# Patient Record
Sex: FEMALE | Race: OTHER | HISPANIC OR LATINO | URBAN - METROPOLITAN AREA
[De-identification: names, ages, dates, MRNs, and addresses within clinical notes are randomized per-mention and may not be internally consistent; named-entity substitution may affect disease eponyms.]

---

## 2020-02-29 ENCOUNTER — EMERGENCY (EMERGENCY)
Facility: HOSPITAL | Age: 45
LOS: 1 days | Discharge: HOME | End: 2020-02-29
Attending: EMERGENCY MEDICINE
Payer: SELF-PAY

## 2020-02-29 VITALS
HEART RATE: 115 BPM | RESPIRATION RATE: 20 BRPM | DIASTOLIC BLOOD PRESSURE: 72 MMHG | SYSTOLIC BLOOD PRESSURE: 117 MMHG | TEMPERATURE: 98 F | OXYGEN SATURATION: 97 %

## 2020-02-29 DIAGNOSIS — Z98.891 HISTORY OF UTERINE SCAR FROM PREVIOUS SURGERY: Chronic | ICD-10-CM

## 2020-02-29 LAB
ALBUMIN SERPL ELPH-MCNC: 4.2 G/DL — SIGNIFICANT CHANGE UP (ref 3.3–5.2)
ALP SERPL-CCNC: 60 U/L — SIGNIFICANT CHANGE UP (ref 40–120)
ALT FLD-CCNC: 40 U/L — HIGH
ANION GAP SERPL CALC-SCNC: 14 MMOL/L — SIGNIFICANT CHANGE UP (ref 5–17)
ANISOCYTOSIS BLD QL: SLIGHT — SIGNIFICANT CHANGE UP
APPEARANCE UR: CLEAR — SIGNIFICANT CHANGE UP
AST SERPL-CCNC: 35 U/L — HIGH
BACTERIA # UR AUTO: ABNORMAL
BASOPHILS # BLD AUTO: 0 K/UL — SIGNIFICANT CHANGE UP (ref 0–0.2)
BASOPHILS NFR BLD AUTO: 0 % — SIGNIFICANT CHANGE UP (ref 0–2)
BILIRUB SERPL-MCNC: 0.7 MG/DL — SIGNIFICANT CHANGE UP (ref 0.4–2)
BILIRUB UR-MCNC: NEGATIVE — SIGNIFICANT CHANGE UP
BUN SERPL-MCNC: 15 MG/DL — SIGNIFICANT CHANGE UP (ref 8–20)
CALCIUM SERPL-MCNC: 9.6 MG/DL — SIGNIFICANT CHANGE UP (ref 8.6–10.2)
CHLORIDE SERPL-SCNC: 97 MMOL/L — LOW (ref 98–107)
CO2 SERPL-SCNC: 23 MMOL/L — SIGNIFICANT CHANGE UP (ref 22–29)
COLOR SPEC: YELLOW — SIGNIFICANT CHANGE UP
CREAT SERPL-MCNC: 0.5 MG/DL — SIGNIFICANT CHANGE UP (ref 0.5–1.3)
DIFF PNL FLD: ABNORMAL
ELLIPTOCYTES BLD QL SMEAR: SLIGHT — SIGNIFICANT CHANGE UP
EOSINOPHIL # BLD AUTO: 0 K/UL — SIGNIFICANT CHANGE UP (ref 0–0.5)
EOSINOPHIL NFR BLD AUTO: 0 % — SIGNIFICANT CHANGE UP (ref 0–6)
EPI CELLS # UR: SIGNIFICANT CHANGE UP
GIANT PLATELETS BLD QL SMEAR: PRESENT — SIGNIFICANT CHANGE UP
GLUCOSE SERPL-MCNC: 133 MG/DL — HIGH (ref 70–99)
GLUCOSE UR QL: NEGATIVE MG/DL — SIGNIFICANT CHANGE UP
HCG UR QL: NEGATIVE — SIGNIFICANT CHANGE UP
HCT VFR BLD CALC: 41.9 % — SIGNIFICANT CHANGE UP (ref 34.5–45)
HGB BLD-MCNC: 14.3 G/DL — SIGNIFICANT CHANGE UP (ref 11.5–15.5)
HYPOCHROMIA BLD QL: SLIGHT — SIGNIFICANT CHANGE UP
KETONES UR-MCNC: NEGATIVE — SIGNIFICANT CHANGE UP
LEUKOCYTE ESTERASE UR-ACNC: NEGATIVE — SIGNIFICANT CHANGE UP
LIDOCAIN IGE QN: 28 U/L — SIGNIFICANT CHANGE UP (ref 22–51)
LYMPHOCYTES # BLD AUTO: 0.1 K/UL — LOW (ref 1–3.3)
LYMPHOCYTES # BLD AUTO: 0.9 % — LOW (ref 13–44)
MACROCYTES BLD QL: SLIGHT — SIGNIFICANT CHANGE UP
MANUAL SMEAR VERIFICATION: SIGNIFICANT CHANGE UP
MCHC RBC-ENTMCNC: 32.9 PG — SIGNIFICANT CHANGE UP (ref 27–34)
MCHC RBC-ENTMCNC: 34.1 GM/DL — SIGNIFICANT CHANGE UP (ref 32–36)
MCV RBC AUTO: 96.3 FL — SIGNIFICANT CHANGE UP (ref 80–100)
MICROCYTES BLD QL: SLIGHT — SIGNIFICANT CHANGE UP
MONOCYTES # BLD AUTO: 0.19 K/UL — SIGNIFICANT CHANGE UP (ref 0–0.9)
MONOCYTES NFR BLD AUTO: 1.7 % — LOW (ref 2–14)
NEUTROPHILS # BLD AUTO: 10.68 K/UL — HIGH (ref 1.8–7.4)
NEUTROPHILS NFR BLD AUTO: 90.4 % — HIGH (ref 43–77)
NEUTS BAND # BLD: 3.5 % — SIGNIFICANT CHANGE UP (ref 0–8)
NITRITE UR-MCNC: NEGATIVE — SIGNIFICANT CHANGE UP
OVALOCYTES BLD QL SMEAR: SLIGHT — SIGNIFICANT CHANGE UP
PH UR: 8 — SIGNIFICANT CHANGE UP (ref 5–8)
PLAT MORPH BLD: NORMAL — SIGNIFICANT CHANGE UP
PLATELET # BLD AUTO: 379 K/UL — SIGNIFICANT CHANGE UP (ref 150–400)
PLATELET COUNT - ESTIMATE: NORMAL — SIGNIFICANT CHANGE UP
POTASSIUM SERPL-MCNC: 4.8 MMOL/L — SIGNIFICANT CHANGE UP (ref 3.5–5.3)
POTASSIUM SERPL-SCNC: 4.8 MMOL/L — SIGNIFICANT CHANGE UP (ref 3.5–5.3)
PROT SERPL-MCNC: 7.9 G/DL — SIGNIFICANT CHANGE UP (ref 6.6–8.7)
PROT UR-MCNC: 30 MG/DL
RBC # BLD: 4.35 M/UL — SIGNIFICANT CHANGE UP (ref 3.8–5.2)
RBC # FLD: 11.9 % — SIGNIFICANT CHANGE UP (ref 10.3–14.5)
RBC BLD AUTO: NORMAL — SIGNIFICANT CHANGE UP
RBC CASTS # UR COMP ASSIST: SIGNIFICANT CHANGE UP /HPF (ref 0–4)
SODIUM SERPL-SCNC: 134 MMOL/L — LOW (ref 135–145)
SP GR SPEC: 1.01 — SIGNIFICANT CHANGE UP (ref 1.01–1.02)
UROBILINOGEN FLD QL: 1 MG/DL
VARIANT LYMPHS # BLD: 3.5 % — SIGNIFICANT CHANGE UP (ref 0–6)
WBC # BLD: 11.37 K/UL — HIGH (ref 3.8–10.5)
WBC # FLD AUTO: 11.37 K/UL — HIGH (ref 3.8–10.5)
WBC UR QL: SIGNIFICANT CHANGE UP

## 2020-02-29 PROCEDURE — 96374 THER/PROPH/DIAG INJ IV PUSH: CPT | Mod: XU

## 2020-02-29 PROCEDURE — 74177 CT ABD & PELVIS W/CONTRAST: CPT

## 2020-02-29 PROCEDURE — 99053 MED SERV 10PM-8AM 24 HR FAC: CPT

## 2020-02-29 PROCEDURE — 96375 TX/PRO/DX INJ NEW DRUG ADDON: CPT

## 2020-02-29 PROCEDURE — 80053 COMPREHEN METABOLIC PANEL: CPT

## 2020-02-29 PROCEDURE — T1013: CPT

## 2020-02-29 PROCEDURE — 85027 COMPLETE CBC AUTOMATED: CPT

## 2020-02-29 PROCEDURE — 81025 URINE PREGNANCY TEST: CPT

## 2020-02-29 PROCEDURE — 36415 COLL VENOUS BLD VENIPUNCTURE: CPT

## 2020-02-29 PROCEDURE — 74177 CT ABD & PELVIS W/CONTRAST: CPT | Mod: 26

## 2020-02-29 PROCEDURE — 81001 URINALYSIS AUTO W/SCOPE: CPT

## 2020-02-29 PROCEDURE — 99285 EMERGENCY DEPT VISIT HI MDM: CPT

## 2020-02-29 PROCEDURE — 83690 ASSAY OF LIPASE: CPT

## 2020-02-29 PROCEDURE — 99284 EMERGENCY DEPT VISIT MOD MDM: CPT | Mod: 25

## 2020-02-29 RX ORDER — ONDANSETRON 8 MG/1
1 TABLET, FILM COATED ORAL
Qty: 21 | Refills: 0
Start: 2020-02-29 | End: 2020-03-06

## 2020-02-29 RX ORDER — ONDANSETRON 8 MG/1
4 TABLET, FILM COATED ORAL ONCE
Refills: 0 | Status: COMPLETED | OUTPATIENT
Start: 2020-02-29 | End: 2020-02-29

## 2020-02-29 RX ORDER — KETOROLAC TROMETHAMINE 30 MG/ML
15 SYRINGE (ML) INJECTION ONCE
Refills: 0 | Status: DISCONTINUED | OUTPATIENT
Start: 2020-02-29 | End: 2020-02-29

## 2020-02-29 RX ADMIN — ONDANSETRON 4 MILLIGRAM(S): 8 TABLET, FILM COATED ORAL at 08:12

## 2020-02-29 RX ADMIN — Medication 15 MILLIGRAM(S): at 08:15

## 2020-02-29 NOTE — ED ADULT NURSE REASSESSMENT NOTE - NS ED NURSE REASSESS COMMENT FT1
pt discharged to follow up with [facility],   discharge instructions via Haitian interpretor,  feels better

## 2020-02-29 NOTE — ED STATDOCS - CARE PROVIDER_API CALL
Kristen Talbot (DO)  Gastroenterology  [address], [address]  [place] [address]  Phone: [phone]  Fax: [phone]  Follow Up Time: 7-10 Days

## 2020-02-29 NOTE — ED STATDOCS - NSFOLLOWUPINSTRUCTIONS_ED_ALL_ED_FT
1)  YOUR CT SCAN FOUND A RIGHT OVARIAN CYST.  MAKE AN APPOINTMENT WITH GYN AT [facility] FOR FOLLOW UP, BRING ALL RESULTS WITH YOU TO YOUR APPOINTMENT.  2) YOUR CT SCAN FOUND FAT IN YOUR LIVER, MAKE AN APPOINTMENT WITH THE GASTROENTEROLOGIST.  3) ZOFRAN IS FOR VOMITING, TAKE AS NEEDED.

## 2020-02-29 NOTE — ED STATDOCS - PATIENT PORTAL LINK FT
You can access the FollowMyHealth Patient Portal offered by [facility] by registering at the following website: [email or web address]. By joining [employer]’s FollowMyHealth portal, you will also be able to view your health information using other applications (apps) compatible with our system.

## 2020-02-29 NOTE — ED STATDOCS - CLINICAL SUMMARY MEDICAL DECISION MAKING FREE TEXT BOX
43 y/o F with diffuse abdominal pain on exam greatest LLQ, will obtain, labs, ua, ct scan, and medicate for symptoms, reassess.

## 2020-02-29 NOTE — ED STATDOCS - CARE PROVIDERS DIRECT ADDRESSES
[email or web address]

## 2020-02-29 NOTE — ED STATDOCS - CARE PLAN
Principal Discharge DX:	Generalized abdominal pain  Secondary Diagnosis:	Vomiting  Secondary Diagnosis:	Fatty liver  Secondary Diagnosis:	Cyst of right ovary

## 2020-02-29 NOTE — ED ADULT TRIAGE NOTE - CHIEF COMPLAINT QUOTE
Pt c/o abdominal pain and N/V since yesterday. Denies any travel, denies chest pain and SOB and fever.

## 2020-02-29 NOTE — ED STATDOCS - OBJECTIVE STATEMENT
45 y/o F presents with c/o vomiting x 7 since 9pm last night and diffuse abdominal pain.  Denies fever, diarrhea, had hard stool this morning.  Has not taken anything for her symptoms, no relieving factors.

## 2020-02-29 NOTE — ED STATDOCS - ATTENDING CONTRIBUTION TO CARE
The patient seen and examined.    Abdominal Pain    I, Lm Thompson, performed the initial face to face bedside interview with this patient regarding history of present illness, review of symptoms and relevant past medical, social and family history.  I completed an independent physical examination.  I was the initial provider who evaluated this patient. I have signed out the follow up of any pending tests (i.e. labs, radiological studies) to the ACP.  I have communicated the patient’s plan of care and disposition with the ACP.

## 2020-11-07 ENCOUNTER — EMERGENCY (EMERGENCY)
Facility: HOSPITAL | Age: 45
LOS: 1 days | Discharge: HOME | End: 2020-11-07
Attending: EMERGENCY MEDICINE
Payer: SELF-PAY

## 2020-11-07 VITALS
WEIGHT: 179.9 LBS | HEART RATE: 82 BPM | SYSTOLIC BLOOD PRESSURE: 115 MMHG | TEMPERATURE: 98 F | HEIGHT: 60 IN | OXYGEN SATURATION: 95 % | DIASTOLIC BLOOD PRESSURE: 71 MMHG | RESPIRATION RATE: 14 BRPM

## 2020-11-07 DIAGNOSIS — Z98.891 HISTORY OF UTERINE SCAR FROM PREVIOUS SURGERY: Chronic | ICD-10-CM

## 2020-11-07 PROCEDURE — 71046 X-RAY EXAM CHEST 2 VIEWS: CPT

## 2020-11-07 PROCEDURE — 72125 CT NECK SPINE W/O DYE: CPT

## 2020-11-07 PROCEDURE — 70450 CT HEAD/BRAIN W/O DYE: CPT | Mod: 26

## 2020-11-07 PROCEDURE — 72125 CT NECK SPINE W/O DYE: CPT | Mod: 26

## 2020-11-07 PROCEDURE — 71046 X-RAY EXAM CHEST 2 VIEWS: CPT | Mod: 26

## 2020-11-07 PROCEDURE — 73030 X-RAY EXAM OF SHOULDER: CPT | Mod: 26,RT

## 2020-11-07 PROCEDURE — 70450 CT HEAD/BRAIN W/O DYE: CPT

## 2020-11-07 PROCEDURE — 73030 X-RAY EXAM OF SHOULDER: CPT

## 2020-11-07 PROCEDURE — T1013: CPT

## 2020-11-07 PROCEDURE — 99284 EMERGENCY DEPT VISIT MOD MDM: CPT | Mod: 25

## 2020-11-07 PROCEDURE — 99285 EMERGENCY DEPT VISIT HI MDM: CPT

## 2020-11-07 RX ORDER — ACETAMINOPHEN 500 MG
650 TABLET ORAL ONCE
Refills: 0 | Status: COMPLETED | OUTPATIENT
Start: 2020-11-07 | End: 2020-11-07

## 2020-11-07 RX ORDER — LIDOCAINE 4 G/100G
1 CREAM TOPICAL
Qty: 5 | Refills: 0
Start: 2020-11-07 | End: 2020-11-11

## 2020-11-07 RX ORDER — LIDOCAINE 4 G/100G
1 CREAM TOPICAL ONCE
Refills: 0 | Status: COMPLETED | OUTPATIENT
Start: 2020-11-07 | End: 2020-11-07

## 2020-11-07 RX ORDER — IBUPROFEN 200 MG
1 TABLET ORAL
Qty: 16 | Refills: 0
Start: 2020-11-07 | End: 2020-11-10

## 2020-11-07 RX ORDER — METHOCARBAMOL 500 MG/1
750 TABLET, FILM COATED ORAL ONCE
Refills: 0 | Status: COMPLETED | OUTPATIENT
Start: 2020-11-07 | End: 2020-11-07

## 2020-11-07 RX ORDER — METHOCARBAMOL 500 MG/1
1 TABLET, FILM COATED ORAL
Qty: 16 | Refills: 0
Start: 2020-11-07 | End: 2020-11-10

## 2020-11-07 RX ADMIN — Medication 650 MILLIGRAM(S): at 21:16

## 2020-11-07 RX ADMIN — METHOCARBAMOL 750 MILLIGRAM(S): 500 TABLET, FILM COATED ORAL at 21:16

## 2020-11-07 RX ADMIN — LIDOCAINE 1 PATCH: 4 CREAM TOPICAL at 21:16
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
## 2020-11-07 NOTE — ED PROVIDER NOTE - PATIENT PORTAL LINK FT
You can access the FollowMyHealth Patient Portal offered by [facility] by registering at the following website: [email or web address]. By joining [employer]’s FollowMyHealth portal, you will also be able to view your health information using other applications (apps) compatible with our system.
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
## 2020-11-07 NOTE — ED PROVIDER NOTE - MUSCULOSKELETAL, MLM
C spine with mild tenderness, and associated paraspinal tenderness, No T-L spine midline tenderness, range of motion is not limited, R sided chest wall tenderness to palp, R shoulder diffusely tender, nl flexion and extension

## 2020-11-08 PROBLEM — K76.0 FATTY (CHANGE OF) LIVER, NOT ELSEWHERE CLASSIFIED: Chronic | Status: ACTIVE | Noted: 2020-02-29

## 2021-02-01 ENCOUNTER — EMERGENCY (EMERGENCY)
Facility: HOSPITAL | Age: 46
LOS: 1 days | Discharge: HOME | End: 2021-02-01
Attending: EMERGENCY MEDICINE
Payer: COMMERCIAL

## 2021-02-01 VITALS
OXYGEN SATURATION: 96 % | DIASTOLIC BLOOD PRESSURE: 85 MMHG | TEMPERATURE: 99 F | HEART RATE: 117 BPM | HEIGHT: 60 IN | RESPIRATION RATE: 20 BRPM | WEIGHT: 179.9 LBS | SYSTOLIC BLOOD PRESSURE: 134 MMHG

## 2021-02-01 VITALS — RESPIRATION RATE: 18 BRPM | HEART RATE: 93 BPM | TEMPERATURE: 98 F | OXYGEN SATURATION: 100 %

## 2021-02-01 DIAGNOSIS — Z98.891 HISTORY OF UTERINE SCAR FROM PREVIOUS SURGERY: Chronic | ICD-10-CM

## 2021-02-01 LAB — SARS-COV-2 RNA SPEC QL NAA+PROBE: SIGNIFICANT CHANGE UP

## 2021-02-01 PROCEDURE — 99284 EMERGENCY DEPT VISIT MOD MDM: CPT

## 2021-02-01 PROCEDURE — U0005: CPT

## 2021-02-01 PROCEDURE — U0003: CPT

## 2021-02-01 RX ORDER — ONDANSETRON 8 MG/1
4 TABLET, FILM COATED ORAL ONCE
Refills: 0 | Status: COMPLETED | OUTPATIENT
Start: 2021-02-01 | End: 2021-02-01

## 2021-02-01 RX ORDER — ONDANSETRON 8 MG/1
1 TABLET, FILM COATED ORAL
Qty: 6 | Refills: 0
Start: 2021-02-01 | End: 2021-02-02

## 2021-02-01 RX ORDER — FAMOTIDINE 10 MG/ML
20 INJECTION INTRAVENOUS ONCE
Refills: 0 | Status: COMPLETED | OUTPATIENT
Start: 2021-02-01 | End: 2021-02-01

## 2021-02-01 RX ADMIN — Medication 30 MILLILITER(S): at 08:09

## 2021-02-01 RX ADMIN — FAMOTIDINE 20 MILLIGRAM(S): 10 INJECTION INTRAVENOUS at 08:09

## 2021-02-01 RX ADMIN — ONDANSETRON 4 MILLIGRAM(S): 8 TABLET, FILM COATED ORAL at 08:09

## 2021-02-01 NOTE — ED ADULT TRIAGE NOTE - CHIEF COMPLAINT QUOTE
Pt arrives to ED c/o  generalized abd pain  nausea and vomiting onset yesterday ,  +  chills - exposed to sick person last Sunday , pt arrives with her daughter who has the same symptoms

## 2021-02-01 NOTE — ED PROVIDER NOTE - PATIENT PORTAL LINK FT
You can access the FollowMyHealth Patient Portal offered by [facility] by registering at the following website: [email or web address]. By joining [employer]’s FollowMyHealth portal, you will also be able to view your health information using other applications (apps) compatible with our system.

## 2021-02-01 NOTE — ED PROVIDER NOTE - OBJECTIVE STATEMENT
45 year old female with no PMHx presents to the ED for diffuse abd pain which started this morning. Pts daughter here with the same sx. Both exposed to COVID 1 week ago. Reports vomiting, diarrhea but denies fevers, chills, cough or shortness of breath.

## 2021-02-01 NOTE — ED PROVIDER NOTE - ATTENDING CONTRIBUTION TO CARE
46 yo female with n/v/d  here with daughter with similar symptoms  +covid exposure  abd soft, nontender  well appearing  po meds, covid swab

## 2021-02-01 NOTE — ED PROVIDER NOTE - CLINICAL SUMMARY MEDICAL DECISION MAKING FREE TEXT BOX
Pt with exposure to covid 1 week ago. Covid vs. gastroenteritis. Daughter here with same, Will test for covid and give GI cocktail and PO challenge.

## 2021-02-01 NOTE — ED PROVIDER NOTE - NSFOLLOWUPINSTRUCTIONS_ED_ALL_ED_FT
Seguimiento con el médico de cabecera en 2-3 días   Instrucciones de yosef de COVID que se le proporcionan   [place] Zofran según sea necesario para las náuseas     Volver a la ED si los vómitos empeoran, presenta dolor que empeora, dificultad para respirar o dolor en el pecho o cualquier otro síntoma nuevo o preocupante

## 2021-02-18 ENCOUNTER — EMERGENCY (EMERGENCY)
Facility: HOSPITAL | Age: 46
LOS: 1 days | Discharge: HOME | End: 2021-02-18
Attending: STUDENT IN AN ORGANIZED HEALTH CARE EDUCATION/TRAINING PROGRAM
Payer: COMMERCIAL

## 2021-02-18 VITALS
DIASTOLIC BLOOD PRESSURE: 75 MMHG | RESPIRATION RATE: 18 BRPM | HEIGHT: 60 IN | HEART RATE: 79 BPM | WEIGHT: 179.9 LBS | TEMPERATURE: 98 F | OXYGEN SATURATION: 99 % | SYSTOLIC BLOOD PRESSURE: 121 MMHG

## 2021-02-18 DIAGNOSIS — Z98.891 HISTORY OF UTERINE SCAR FROM PREVIOUS SURGERY: Chronic | ICD-10-CM

## 2021-02-18 PROCEDURE — 99284 EMERGENCY DEPT VISIT MOD MDM: CPT | Mod: 25

## 2021-02-18 PROCEDURE — 70450 CT HEAD/BRAIN W/O DYE: CPT | Mod: 26

## 2021-02-18 PROCEDURE — 73080 X-RAY EXAM OF ELBOW: CPT

## 2021-02-18 PROCEDURE — 72125 CT NECK SPINE W/O DYE: CPT | Mod: 26

## 2021-02-18 PROCEDURE — 72125 CT NECK SPINE W/O DYE: CPT

## 2021-02-18 PROCEDURE — 70450 CT HEAD/BRAIN W/O DYE: CPT

## 2021-02-18 PROCEDURE — 99285 EMERGENCY DEPT VISIT HI MDM: CPT

## 2021-02-18 PROCEDURE — 73080 X-RAY EXAM OF ELBOW: CPT | Mod: 26,LT

## 2021-02-18 RX ORDER — IBUPROFEN 200 MG
1 TABLET ORAL
Qty: 28 | Refills: 0
Start: 2021-02-18 | End: 2021-02-24

## 2021-02-18 RX ORDER — ACETAMINOPHEN 500 MG
650 TABLET ORAL ONCE
Refills: 0 | Status: COMPLETED | OUTPATIENT
Start: 2021-02-18 | End: 2021-02-18

## 2021-02-18 RX ORDER — METHOCARBAMOL 500 MG/1
1 TABLET, FILM COATED ORAL
Qty: 14 | Refills: 0
Start: 2021-02-18 | End: 2021-02-24

## 2021-02-18 RX ADMIN — Medication 650 MILLIGRAM(S): at 11:41

## 2021-02-18 NOTE — ED STATDOCS - PATIENT PORTAL LINK FT
You can access the FollowMyHealth Patient Portal offered by [facility] by registering at the following website: [email or web address]. By joining [employer]’s FollowMyHealth portal, you will also be able to view your health information using other applications (apps) compatible with our system.

## 2021-02-18 NOTE — ED STATDOCS - CARE PROVIDER_API CALL
Jimmy Davies)  Orthopaedic Surgery  [address] [place] [address]  Phone: [phone]  Fax: [phone]  Follow Up Time:

## 2021-02-18 NOTE — ED STATDOCS - NS ED ROS FT
ROS:  GEN: (-) fevers/chills  NECK: (-) stiffness, (-) swelling  RESP: (-) shortness of breath, (-) cough  CV: (-) chest pain, (-) palpitations  GI: (-) nausea, (-) vomiting, (-) pain, (-) constipation, (-) diarrhea  : (-) hematuria, (-) dysuria  EXT: (+) elbow pain (-) edema  NEURO: (-) weakness, (+) headache, (-) dizziness, (-) syncope  MSK: (+) back pain

## 2021-02-18 NOTE — ED STATDOCS - CLINICAL SUMMARY MEDICAL DECISION MAKING FREE TEXT BOX
Check head CT, Xray, provide pain control and reassess CT r/o traumatic injury. xray elbow, provide pain control and reassess

## 2021-02-18 NOTE — ED STATDOCS - DOMESTIC TRAVEL HIGH RISK QUESTION
Dr. Reynoso  Good evening, for the past two weeks, I have been suffering with my sinuses and now I believe it developed into an URI and was wondering if I could have a prescription for Azithromycin (Z-Pack) sent to my pharmacy on file. I normally would take Loratadine (Claritin) on a daily basis but my insurance doesn't cover it, so I also wanting a prescription for my sinus to take on daily basis.  Thank you    This message was sent via [employer].  Please advise.   No
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
## 2021-02-18 NOTE — ED STATDOCS - NSFOLLOWUPINSTRUCTIONS_ED_ALL_ED_FT
Closed Head Injury    A closed head injury is an injury to your head that may or may not involve a traumatic brain injury (TBI).  A CT scan of the head may not have been performed because they are usually normal after a concussion. Concussions are diagnosed and managed based on the history given and the symptoms experienced after the head injury. Most concussions do not cause serious problem and get better over several days.  Symptoms of TBI can be short or long lasting and include headache, dizziness, interference with memory or speech, fatigue, confusion, changes in sleep, mood changes, nausea, depression/anxiety, and dulling of senses. Make sure to obtain proper rest which includes getting plenty of sleep, avoiding excessive visual stimulation, and avoiding activities that may cause physical or mental stress. Avoid any situation where there is potential for another head injury, including sports.    SEEK IMMEDIATE MEDICAL CARE IF YOU HAVE ANY OF THE FOLLOWING SYMPTOMS: unusual drowsiness, vomiting, severe dizziness, seizures, lightheadedness, muscular weakness, different pupil sizes, visual changes, or clear or bloody discharge from your ears or nose.     Contusion    A contusion is a deep bruise. Contusions are the result of a blunt injury to tissues and muscle fibers under the skin. The skin overlying the contusion may turn blue, purple, or yellow. Symptoms also include pain and swelling in the injured area.    SEEK IMMEDIATE MEDICAL CARE IF YOU HAVE ANY OF THE FOLLOWING SYMPTOMS: severe pain, numbness, tingling, pain, weakness, or skin color/temperature change in any part of your body distal to the injury.     Please follow up with your doctor within 48 hours

## 2021-02-18 NOTE — ED STATDOCS - ATTENDING CONTRIBUTION TO CARE
I, Bianca Kohli, performed a face to face bedside interview with this patient regarding history of present illness, review of symptoms and relevant past medical, social and family history.  I completed an independent physical examination. Medical decision making, follow-up on ordered tests (ie labs, radiologic studies) and re-evaluation of the patient's status has been communicated to the ACP.  Disposition of the patient will be based on test outcome and response to ED interventions.

## 2021-02-18 NOTE — ED ADULT TRIAGE NOTE - CHIEF COMPLAINT QUOTE
pt A&OX 4 from c/o headache after slip & fall on ice onto concrete hitting back of head. Pt denies LOC or blood thinner use. Pt with no visible injuries. Ambulatory in ED with steady gait

## 2021-02-18 NOTE — ED STATDOCS - PROGRESS NOTE DETAILS
PT evaluated by intake physician. HPI/ROS/PE as noted above. Will follow up plan per intake physician   ED return precautions discussed.

## 2021-02-18 NOTE — ED STATDOCS - PHYSICAL EXAMINATION
Vital Signs per nursing documentation  Gen: well appearing, no acute distress  HEENT: NCAT, MMM  Cardiac: regular rate rhythm, normal S1S2  Chest: clear to auscultation bilateral, no wheezes or crackles  Abdomen: soft, non tender non distended  Extremity: no gross deformity, good perfusion  Skin: no rash  Neuro: nonfocal neuro exam, gait steady Vital Signs per nursing documentation  Gen: well appearing, no acute distress  HEENT: NCAT, MMM, PERRLA  Cardiac: regular rate rhythm, normal S1S2  Chest: clear to auscultation bilateral, no wheezes or crackles  Abdomen: soft, non tender non distended  Extremity: no gross deformity, good perfusion  Skin: no rash  Neuro: nonfocal neuro exam, gait steady

## 2021-02-18 NOTE — ED STATDOCS - CARE PROVIDERS DIRECT ADDRESSES
[email or web address]

## 2021-12-21 ENCOUNTER — APPOINTMENT (OUTPATIENT)
Dept: ORTHOPEDIC SURGERY | Facility: CLINIC | Age: 46
End: 2021-12-21
Payer: COMMERCIAL

## 2021-12-21 VITALS
HEIGHT: 60 IN | WEIGHT: 190 LBS | DIASTOLIC BLOOD PRESSURE: 80 MMHG | HEART RATE: 88 BPM | BODY MASS INDEX: 37.3 KG/M2 | SYSTOLIC BLOOD PRESSURE: 118 MMHG

## 2021-12-21 VITALS
HEART RATE: 88 BPM | HEIGHT: 60 IN | BODY MASS INDEX: 41.23 KG/M2 | WEIGHT: 210 LBS | DIASTOLIC BLOOD PRESSURE: 80 MMHG | SYSTOLIC BLOOD PRESSURE: 118 MMHG

## 2021-12-21 DIAGNOSIS — M70.62 TROCHANTERIC BURSITIS, RIGHT HIP: ICD-10-CM

## 2021-12-21 DIAGNOSIS — M47.816 SPONDYLOSIS W/OUT MYELOPATHY OR RADICULOPATHY, LUMBAR REGION: ICD-10-CM

## 2021-12-21 DIAGNOSIS — M70.61 TROCHANTERIC BURSITIS, RIGHT HIP: ICD-10-CM

## 2021-12-21 PROCEDURE — 99203 OFFICE O/P NEW LOW 30 MIN: CPT

## 2021-12-21 PROCEDURE — 99072 ADDL SUPL MATRL&STAF TM PHE: CPT

## 2021-12-21 PROCEDURE — 72100 X-RAY EXAM L-S SPINE 2/3 VWS: CPT

## 2021-12-21 NOTE — ADDENDUM
[FreeTextEntry1] : Documented by Tripp Thibodeaux acting as a scribe for April Scales on 12/21/2021 . All medical record entries made by the Scribe were at my, April Scales , direction and personally dictated by me on 12/21/2021 . I have reviewed the chart and agree that the record accurately reflects my personal performance of the history, physical exam, assessment and plan. I have also personally directed, reviewed, and agreed with the chart.

## 2021-12-21 NOTE — PHYSICAL EXAM
[Obese] : obese [Poor Appearance] : well-appearing [Acute Distress] : not in acute distress [de-identified] : CONSTITUTIONAL: The patient is a very pleasant individual who is well-nourished and who appears stated age.\par PSYCHIATRIC: The patient is alert and oriented X 3 and in no apparent distress, and participates with orthopedic evaluation well.\par HEAD: Atraumatic and is nonsyndromic in appearance.\par EENT: No visible thyromegaly, EOMI.\par RESPIRATORY: Respiratory rate is regular, not dyspneic on examination.\par LYMPHATICS: There is no inguinal lymphadenopathy\par INTEGUMENTARY: Skin is clean, dry, and intact about the bilateral lower extremities and lumbar spine.\par VASCULAR: There is brisk capillary refill about the bilateral lower extremities. Deep tendon reflexes are well maintained at 2+/4 of the bilateral lower extremities and are symmetric.\par MUSCULOSKELETAL: There is no visible muscular atrophy. Manual motor strength is well maintained in the bilateral lower extremities. mechanically oriented lower back pain, pain with lumbar extension. + Tension sign and straight leg raise bilaterally. Quad extension, ankle dorsiflexion, EHL, plantar flexion, and ankle eversion are well preserved. Normal secondary orthopaedic exam of bilateral hips, greater trochanteric area, knees and ankles. No pain with internal or external rotation of the bilateral hips. Pain with palpation to the lumbar paraspinals BL. greater trochanteric bursitis [de-identified] : Xray of the Lumbar spine taken 12/21/2021 : AP projection shows pedicles are well visualized L1-L5, no rotoscoliosis, lateral projections show well maintained lumbar lordosis with no acute processes noted. Mild decrease in intervertebral disc space.

## 2021-12-21 NOTE — DISCUSSION/SUMMARY
[de-identified] : We talked about the nature of the condition and treatment options. Anticipatory guidance regarding neurogenic claudication and mechanically oriented lower back pain. Patient has been referred to physical therapy for decreased pain modalities, stretching and strengthening modalities, soft tissue modalities, and physical modalities. I will provide a prescription for Medrol dose pack for pain relief, patient was educated on the antiinflammatory properties of this medication and how this will help their pain. The patient will follow up in 4 - 6 weeks for a repeat clinical assessment If pain persists at this point we will consider ordering an MRI to further assess these complaints. \par \par Prior to appointment and during encounter with patient extensive medical records were reviewed including but not limited to, hospital records, out patient records, imaging results, and lab data. During this appointment the patient was examined, diagnoses were discussed and explained in a face to face manner. In addition extensive time was spent reviewing aforementioned diagnostic studies. Counseling including abnormal image results, differential diagnoses, treatment options, risk and benefits, lifestyle changes, current condition, and current medications was performed. Patient's comments, questions, and concerns were address and patient verbalized understanding. Based on this patient's presentation at our office, which is an orthopedic spine surgeon's office, this patient inherently / intrinsically has a risk, however minute, of developing  issues such as Cauda equina syndrome, bowel and bladder changes, or progression of motor or neurological deficits such as paralysis which may be permanent.

## 2021-12-21 NOTE — HISTORY OF PRESENT ILLNESS
[de-identified] : 46 year old F presents for an initial evaluation of left sided lower back pain, particularly painful today. There is pain radiating down the BL LEs. She states that when she tries to get up the pain forces her to return to a sitting position. Pain has been present for 1 year. Pain is becoming more frequent. No conservative care, she is taking Advil and Tylenol. She states she takes a high dose of these medications. Pain is achy in quality. She states walking is significantly painful. \par \par Visit conducted in [place]. Of note patient states her job requires long periods of sitting and standing.  [Ataxia] : no ataxia [Incontinence] : no incontinence [Loss of Dexterity] : good dexterity [Urinary Ret.] : no urinary retention

## 2022-10-28 ENCOUNTER — APPOINTMENT (OUTPATIENT)
Dept: RHEUMATOLOGY | Facility: CLINIC | Age: 47
End: 2022-10-28

## 2022-11-18 ENCOUNTER — APPOINTMENT (OUTPATIENT)
Dept: RHEUMATOLOGY | Facility: CLINIC | Age: 47
End: 2022-11-18

## 2023-04-24 ENCOUNTER — EMERGENCY (EMERGENCY)
Facility: HOSPITAL | Age: 48
LOS: 1 days | Discharge: HOME | End: 2023-04-24
Attending: EMERGENCY MEDICINE
Payer: COMMERCIAL

## 2023-04-24 VITALS
RESPIRATION RATE: 18 BRPM | SYSTOLIC BLOOD PRESSURE: 115 MMHG | DIASTOLIC BLOOD PRESSURE: 79 MMHG | TEMPERATURE: 98 F | HEART RATE: 70 BPM | OXYGEN SATURATION: 99 %

## 2023-04-24 VITALS — WEIGHT: 195.11 LBS | HEIGHT: 64 IN

## 2023-04-24 DIAGNOSIS — Z98.891 HISTORY OF UTERINE SCAR FROM PREVIOUS SURGERY: Chronic | ICD-10-CM

## 2023-04-24 LAB — S PYO DNA THROAT QL NAA+PROBE: SIGNIFICANT CHANGE UP

## 2023-04-24 PROCEDURE — 87798 DETECT AGENT NOS DNA AMP: CPT

## 2023-04-24 PROCEDURE — 99284 EMERGENCY DEPT VISIT MOD MDM: CPT

## 2023-04-24 PROCEDURE — 99283 EMERGENCY DEPT VISIT LOW MDM: CPT

## 2023-04-24 PROCEDURE — 87651 STREP A DNA AMP PROBE: CPT

## 2023-04-24 PROCEDURE — T1013: CPT

## 2023-04-24 PROCEDURE — 96372 THER/PROPH/DIAG INJ SC/IM: CPT

## 2023-04-24 RX ORDER — KETOROLAC TROMETHAMINE 30 MG/ML
15 SYRINGE (ML) INJECTION ONCE
Refills: 0 | Status: DISCONTINUED | OUTPATIENT
Start: 2023-04-24 | End: 2023-04-24

## 2023-04-24 RX ADMIN — Medication 15 MILLIGRAM(S): at 09:59

## 2023-04-24 RX ADMIN — Medication 15 MILLIGRAM(S): at 10:32

## 2023-04-24 NOTE — ED PROVIDER NOTE - OBJECTIVE STATEMENT
47F no PMHx p/w cough, congestion, low grade fevers, sore throat for 2 days.  Otherwise denies bleeding, SOB, chest pain, abdominal pain or fevers.  Denies other pertinent medical problems.  Denies any substance use.

## 2023-04-24 NOTE — ED ADULT TRIAGE NOTE - CHIEF COMPLAINT QUOTE
pt c/o sore throat and dry cough for 4 days with difficulty swallowing due to the pain. pt states she had ow grade fevers. pt states she took tylenol with minimal relief.

## 2023-04-24 NOTE — ED PROVIDER NOTE - CLINICAL SUMMARY MEDICAL DECISION MAKING FREE TEXT BOX
Rapid strep neg, pain meds given, Workup is unremarkable for any emergent process.   Patient is now feeling improved and wishes to leave.  Patient / family members have capacity.    Patient/family was given full return precautions, counseled on red flag symptoms such as LOC, fever, severe pain, or focal deficits and advised to return to the ED for these reasons or any reason that was concerning to them. Patient/family was informed of all significant and incidental findings found on this workup today and all results were reviewed.   All questions were answered, advised to make close follow up with their primary care provider and specialty clinics (as applicable) to follow up with this visit and continue investigation/treatment.   Patient/family has shown adequate understanding and is agreeable to the plan.

## 2023-04-24 NOTE — ED ADULT NURSE NOTE - OBJECTIVE STATEMENT
Assumed care of pt at 0940 in . Pt A&Ox4 c/o sore throat for approx 4 days. Pt denies any respiratory distress. Pt denies CP and SOB. RR Even and unlabored.

## 2023-04-24 NOTE — ED PROVIDER NOTE - PATIENT PORTAL LINK FT
You can access the FollowMyHealth Patient Portal offered by [facility] by registering at the following website: [email or web address]. By joining [employer]’s FollowMyHealth portal, you will also be able to view your health information using other applications (apps) compatible with our system.

## 2023-04-24 NOTE — ED PROVIDER NOTE - NSFOLLOWUPINSTRUCTIONS_ED_ALL_ED_FT
Viral Respiratory Infection    A viral respiratory infection is an illness that affects parts of the body used for breathing, like the lungs, nose, and throat. It is caused by a germ called a virus. Symptoms can include runny nose, coughing, sneezing, fatigue, body aches, sore throat, fever, or headache. Over the counter medicine can be used to manage the symptoms but the infection typically goes away on its own in 5 to 10 days.     SEEK IMMEDIATE MEDICAL CARE IF YOU HAVE ANY OF THE FOLLOWING SYMPTOMS: shortness of breath, chest pain, fever over 10 days, or lightheadedness/dizziness.    Infección respiratoria viral    Marlena infección respiratoria viral es marlena enfermedad que afecta partes del cuerpo que se usan para respirar, rosemary los pulmones, la nariz y la garganta. Es causada por un germen llamado virus. Los síntomas pueden incluir secreción nasal, tos, estornudos, fatiga, kae corporales, dolor de garganta, fiebre o dolor de cinthia. Se pueden usar medicamentos de venta marie para controlar los síntomas, philly la infección generalmente desaparece por sí bayron en 5 a 10 días.    BUSQUE ATENCIÓN MÉDICA INMEDIATA SI TIENE ALGUNO DE LOS SIGUIENTES SÍNTOMAS: dificultad para respirar, dolor en el pecho, fiebre amanda 10 días o aturdimiento/mareos.

## 2023-04-24 NOTE — ED PROVIDER NOTE - ATTENDING CONTRIBUTION TO CARE
Dr. Ag, Attending Physician-  I performed a face to face bedside interview with patient regarding history of present illness, review of symptoms and past medical history. I completed an independent physical exam.  I have discussed patient's plan of care with the resident.

## 2023-04-24 NOTE — ED PROVIDER NOTE - PHYSICAL EXAMINATION
General: NAD, well appearing  HEENT: Normocephalic, atraumatic, PERRLA, red tonsils no exudate, + congestion   Neck: No apparent stiffness or JVD, mild adenopathy  Pulm: Chest wall symmetric and nontender, lungs clear to ascultation   Cardiac: Regular rate and regular rhythm  Abdomen: Nontender and nondistended  Skin: Skin is warm, dry and intact without rashes or lesions.  Neuro: No motor or sensory deficits    MSK: No deformity or tenderness

## 2023-04-24 NOTE — ED ADULT NURSE NOTE - NSSUHOSCREENINGYN_ED_ALL_ED
Chief Complaint  New Patient (LEFT HAMSTRING DOI: 9/2/17 putting item on bottom shelf went to stand up and felt pain )      History of Present Illness:  Nirali Acevedo is a 20 y.o. y/o male who presents today for evaluation of his left hamstring and leg. 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 He states that at later that night got worse. He tried working again soon after and thinks he made it even worse and has had pain in the posterior aspect of his leg as well as lateral aspect of his leg. He saw his primary care doctor who referred him to physical therapy. He does think he's had some improvement, but with work after he is working for a long period time he has worsening pain. States he's had occasional numbness and tingling down his leg, but denies weakness in his foot. Denies previous injury or treatment for this issue. Has been taking naproxen which helps. Occupation/activities: [email or web address]    Medical History  No past medical history on file. No past surgical history on file. Social History     Social History    Marital status: Single     Spouse name: N/A    Number of children: N/A    Years of education: N/A     Occupational History    Not on file. Social History Main Topics    Smoking status: Never Smoker    Smokeless tobacco: Not on file    Alcohol use No    Drug use: No    Sexual activity: Not on file     Other Topics Concern    Not on file     Social History Narrative       No family history on file. Review of Systems  Pertinent items are noted in HPI  Review of systems reviewed from Patient History Form dated on 10/2/17 and available in the patient's chart under the Media tab. Vital Signs  Vitals:    10/02/17 0823   BP: (!) 139/96   Pulse: 92       General Exam:   Constitutional: Patient is adequately groomed with no evidence of malnutrition  Mental Status:  The patient is Yes - the patient is able to be screened

## 2023-05-22 ENCOUNTER — APPOINTMENT (OUTPATIENT)
Dept: GASTROENTEROLOGY | Facility: CLINIC | Age: 48
End: 2023-05-22
Payer: MEDICAID

## 2023-05-22 ENCOUNTER — NON-APPOINTMENT (OUTPATIENT)
Age: 48
End: 2023-05-22

## 2023-05-22 VITALS
RESPIRATION RATE: 16 BRPM | SYSTOLIC BLOOD PRESSURE: 110 MMHG | HEIGHT: 60 IN | TEMPERATURE: 98 F | WEIGHT: 190 LBS | BODY MASS INDEX: 37.3 KG/M2 | DIASTOLIC BLOOD PRESSURE: 82 MMHG | HEART RATE: 104 BPM | OXYGEN SATURATION: 96 %

## 2023-05-22 DIAGNOSIS — R00.0 TACHYCARDIA, UNSPECIFIED: ICD-10-CM

## 2023-05-22 DIAGNOSIS — R10.9 UNSPECIFIED ABDOMINAL PAIN: ICD-10-CM

## 2023-05-22 PROCEDURE — 99204 OFFICE O/P NEW MOD 45 MIN: CPT

## 2023-05-22 RX ORDER — SODIUM SULFATE, POTASSIUM SULFATE AND MAGNESIUM SULFATE 1.6; 3.13; 17.5 G/177ML; G/177ML; G/177ML
17.5-3.13-1.6 SOLUTION ORAL
Qty: 2 | Refills: 0 | Status: ACTIVE | COMMUNITY
Start: 2023-05-22 | End: 1900-01-01

## 2023-05-22 NOTE — REVIEW OF SYSTEMS
[Negative] : Heme/Lymph [Fever] : no fever [Chills] : no chills [Feeling Tired] : not feeling tired [Recent Weight Loss (___ Lbs)] : no recent weight loss

## 2023-05-22 NOTE — REASON FOR VISIT
[Consultation] : a consultation visit [Interpreters_IDNumber] : [id number] [Interpreters_FullName] : Varinder

## 2023-05-22 NOTE — ASSESSMENT
[FreeTextEntry1] : 47-year-old female presents for evaluation of left lower quadrant pain\par \par \par Left lower quadrant pain\par Will obtain CT abdomen pelvis with IV contrast to evaluate for diverticulitis given left lower quadrant exam and tachycardia if CT scan is negative for acute inflammation we will proceed with colonoscopy\par If there is active inflammation we will recommend a brief course of antibiotics and then waiting 6 to 8 weeks prior to colonoscopy\par Colonoscopy procedure and instructions reviewed with patient\par Bowel prep sent to pharmacy\par \par Rectal bleeding\par This may be hemorrhoidal\par However she has never been screened for colon cancer\par She will need a colonoscopy\par \par Tachycardia\par This may be due to phentermine\par I advised the patient follow-up with her primary care provider in the [place] to advise them of the current medication she is taking\par Last modifications including high-protein low carbohydrate diet and a goal of 10,000 steps per day reviewed with patient as a means of lifestyle changes to ensure healthy weight loss

## 2023-05-22 NOTE — PHYSICAL EXAM
[Alert] : alert [Normal Voice/Communication] : normal voice/communication [No Acute Distress] : no acute distress [Sclera] : the sclera and conjunctiva were normal [Hearing Threshold Finger Rub Not [place]] : hearing was normal [Normal Lips/Gums] : the lips and gums were normal [Oropharynx] : the oropharynx was normal [Normal Appearance] : the appearance of the neck was normal [No Neck Mass] : no neck mass was observed [No Respiratory Distress] : no respiratory distress [No Acc Muscle Use] : no accessory muscle use [Respiration, Rhythm And Depth] : normal respiratory rhythm and effort [Auscultation Breath Sounds / Voice Sounds] : lungs were clear to auscultation bilaterally [None] : no edema [Bowel Sounds] : normal bowel sounds [No Masses] : no abdominal mass palpated [Abdomen Soft] : soft [Cervical Lymph Nodes Enlarged Posterior Bilaterally] : no posterior cervical lymphadenopathy [Supraclavicular Lymph Nodes Enlarged Bilaterally] : no supraclavicular lymphadenopathy [Cervical Lymph Nodes Enlarged Anterior Bilaterally] : no anterior cervical lymphadenopathy [No CVA Tenderness] : no CVA  tenderness [No Spinal Tenderness] : no spinal tenderness [Abnormal Walk] : normal gait [Normal Color / Pigmentation] : normal skin color and pigmentation [No Focal Deficits] : no focal deficits [Oriented To Time, Place, And Person] : oriented to person, place, and time [LLQ] : LLQ [de-identified] : Tachycardic

## 2023-05-22 NOTE — HISTORY OF PRESENT ILLNESS
[FreeTextEntry1] : 47-year-old female presents for evaluation for chronic left lower quadrant pain.  She states that the pain is described as inflammation.  The pain is intermittent.  She states it is related to foods made worse with spicy foods.  She says that in her country she was told that she had a spastic colon.  She also reports remittent rectal bleeding associated with rectal pain.  She reports regular formed bowel movements every day.  She denies unintentional weight loss.  She is started to take phentermine that was prescribed in her home country.  She is tachycardic today on exam.\par \par Patient denies nausea, vomiting, abdominal pain, diarrhea, melena, pruritus, dysphagia, or acid reflux.\par \par Patient denies family history of colon cancer.

## 2023-05-30 ENCOUNTER — RESULT REVIEW (OUTPATIENT)
Age: 48
End: 2023-05-30

## 2023-06-03 ENCOUNTER — APPOINTMENT (OUTPATIENT)
Dept: CT IMAGING | Facility: CLINIC | Age: 48
End: 2023-06-03
Payer: MEDICAID

## 2023-06-03 ENCOUNTER — OUTPATIENT (OUTPATIENT)
Dept: OUTPATIENT SERVICES | Facility: HOSPITAL | Age: 48
LOS: 1 days | End: 2023-06-03
Payer: COMMERCIAL

## 2023-06-03 DIAGNOSIS — K62.5 HEMORRHAGE OF ANUS AND RECTUM: ICD-10-CM

## 2023-06-03 DIAGNOSIS — Z98.891 HISTORY OF UTERINE SCAR FROM PREVIOUS SURGERY: Chronic | ICD-10-CM

## 2023-06-03 DIAGNOSIS — R10.9 UNSPECIFIED ABDOMINAL PAIN: ICD-10-CM

## 2023-06-03 PROCEDURE — 74177 CT ABD & PELVIS W/CONTRAST: CPT

## 2023-06-03 PROCEDURE — 74177 CT ABD & PELVIS W/CONTRAST: CPT | Mod: 26

## 2023-06-09 ENCOUNTER — NON-APPOINTMENT (OUTPATIENT)
Age: 48
End: 2023-06-09

## 2023-06-09 LAB
ALBUMIN SERPL ELPH-MCNC: 4.3 G/DL
ALP BLD-CCNC: 64 U/L
ALT SERPL-CCNC: 21 U/L
ANION GAP SERPL CALC-SCNC: 12 MMOL/L
AST SERPL-CCNC: 22 U/L
BILIRUB SERPL-MCNC: 0.3 MG/DL
BUN SERPL-MCNC: 11 MG/DL
CALCIUM SERPL-MCNC: 9.6 MG/DL
CHLORIDE SERPL-SCNC: 104 MMOL/L
CO2 SERPL-SCNC: 26 MMOL/L
CREAT SERPL-MCNC: 0.7 MG/DL
EGFR: 107 ML/MIN/1.73M2
GLUCOSE SERPL-MCNC: 97 MG/DL
POTASSIUM SERPL-SCNC: 4.1 MMOL/L
PROT SERPL-MCNC: 7.1 G/DL
SODIUM SERPL-SCNC: 141 MMOL/L

## 2023-08-28 ENCOUNTER — TRANSCRIPTION ENCOUNTER (OUTPATIENT)
Age: 48
End: 2023-08-28

## 2023-08-29 ENCOUNTER — APPOINTMENT (OUTPATIENT)
Dept: GASTROENTEROLOGY | Facility: GI CENTER | Age: 48
End: 2023-08-29
Payer: MEDICAID

## 2023-08-29 ENCOUNTER — OUTPATIENT (OUTPATIENT)
Dept: OUTPATIENT SERVICES | Facility: HOSPITAL | Age: 48
LOS: 1 days | End: 2023-08-29
Payer: COMMERCIAL

## 2023-08-29 DIAGNOSIS — K62.5 HEMORRHAGE OF ANUS AND RECTUM: ICD-10-CM

## 2023-08-29 DIAGNOSIS — Z98.891 HISTORY OF UTERINE SCAR FROM PREVIOUS SURGERY: Chronic | ICD-10-CM

## 2023-08-29 DIAGNOSIS — K64.9 UNSPECIFIED HEMORRHOIDS: ICD-10-CM

## 2023-08-29 DIAGNOSIS — K57.30 DIVERTICULOSIS OF LARGE INTESTINE W/OUT PERFORATION OR ABSCESS W/OUT BLEEDING: ICD-10-CM

## 2023-08-29 PROCEDURE — 45378 DIAGNOSTIC COLONOSCOPY: CPT

## 2023-08-29 NOTE — PHYSICAL EXAM
[Alert] : alert [Normal Voice/Communication] : normal voice/communication [No Acute Distress] : no acute distress [Sclera] : the sclera and conjunctiva were normal [Hearing Threshold Finger Rub Not [place]] : hearing was normal [Normal Lips/Gums] : the lips and gums were normal [Oropharynx] : the oropharynx was normal [Normal Appearance] : the appearance of the neck was normal [No Neck Mass] : no neck mass was observed [No Respiratory Distress] : no respiratory distress [No Acc Muscle Use] : no accessory muscle use [Respiration, Rhythm And Depth] : normal respiratory rhythm and effort [Auscultation Breath Sounds / Voice Sounds] : lungs were clear to auscultation bilaterally [None] : no edema [Bowel Sounds] : normal bowel sounds [No Masses] : no abdominal mass palpated [Abdomen Soft] : soft [LLQ] : LLQ [Cervical Lymph Nodes Enlarged Posterior Bilaterally] : no posterior cervical lymphadenopathy [Supraclavicular Lymph Nodes Enlarged Bilaterally] : no supraclavicular lymphadenopathy [Cervical Lymph Nodes Enlarged Anterior Bilaterally] : no anterior cervical lymphadenopathy [No CVA Tenderness] : no CVA  tenderness [No Spinal Tenderness] : no spinal tenderness [Abnormal Walk] : normal gait [Normal Color / Pigmentation] : normal skin color and pigmentation [No Focal Deficits] : no focal deficits [Oriented To Time, Place, And Person] : oriented to person, place, and time [de-identified] : Tachycardic

## 2023-08-29 NOTE — REVIEW OF SYSTEMS
[Fever] : no fever [Chills] : no chills [Feeling Tired] : not feeling tired [Recent Weight Loss (___ Lbs)] : no recent weight loss [Negative] : Heme/Lymph

## 2024-01-04 NOTE — ED STATDOCS - PROGRESS NOTE DETAILS
NP NOTE:  Pain and vomiting resolved, tolerating po.  CT with fatty liver, mild bump in LFT's, will refer to GI.  Ovarian cyst, f/u GYN Discussed with psychiatry, okay for discharge with outpatietn follow-up -Archana Thompson MD

## 2024-01-15 ENCOUNTER — EMERGENCY (EMERGENCY)
Facility: HOSPITAL | Age: 49
LOS: 1 days | Discharge: HOME | End: 2024-01-15
Attending: STUDENT IN AN ORGANIZED HEALTH CARE EDUCATION/TRAINING PROGRAM
Payer: COMMERCIAL

## 2024-01-15 VITALS
HEART RATE: 62 BPM | WEIGHT: 186.07 LBS | TEMPERATURE: 99 F | SYSTOLIC BLOOD PRESSURE: 132 MMHG | OXYGEN SATURATION: 100 % | RESPIRATION RATE: 18 BRPM | DIASTOLIC BLOOD PRESSURE: 87 MMHG

## 2024-01-15 DIAGNOSIS — Z98.891 HISTORY OF UTERINE SCAR FROM PREVIOUS SURGERY: Chronic | ICD-10-CM

## 2024-01-15 LAB
ALBUMIN SERPL ELPH-MCNC: 4.1 G/DL — SIGNIFICANT CHANGE UP (ref 3.3–5.2)
ALP SERPL-CCNC: 62 U/L — SIGNIFICANT CHANGE UP (ref 40–120)
ALT FLD-CCNC: 30 U/L — SIGNIFICANT CHANGE UP
ANION GAP SERPL CALC-SCNC: 10 MMOL/L — SIGNIFICANT CHANGE UP (ref 5–17)
APTT BLD: 31 SEC — SIGNIFICANT CHANGE UP (ref 24.5–35.6)
AST SERPL-CCNC: 24 U/L — SIGNIFICANT CHANGE UP
BASOPHILS # BLD AUTO: 0.05 K/UL — SIGNIFICANT CHANGE UP (ref 0–0.2)
BASOPHILS NFR BLD AUTO: 0.6 % — SIGNIFICANT CHANGE UP (ref 0–2)
BILIRUB SERPL-MCNC: <0.2 MG/DL — LOW (ref 0.4–2)
BUN SERPL-MCNC: 14.3 MG/DL — SIGNIFICANT CHANGE UP (ref 8–20)
CALCIUM SERPL-MCNC: 9.1 MG/DL — SIGNIFICANT CHANGE UP (ref 8.4–10.5)
CHLORIDE SERPL-SCNC: 103 MMOL/L — SIGNIFICANT CHANGE UP (ref 96–108)
CO2 SERPL-SCNC: 26 MMOL/L — SIGNIFICANT CHANGE UP (ref 22–29)
CREAT SERPL-MCNC: 0.82 MG/DL — SIGNIFICANT CHANGE UP (ref 0.5–1.3)
EGFR: 88 ML/MIN/1.73M2 — SIGNIFICANT CHANGE UP
EOSINOPHIL # BLD AUTO: 0.11 K/UL — SIGNIFICANT CHANGE UP (ref 0–0.5)
EOSINOPHIL NFR BLD AUTO: 1.4 % — SIGNIFICANT CHANGE UP (ref 0–6)
GLUCOSE SERPL-MCNC: 91 MG/DL — SIGNIFICANT CHANGE UP (ref 70–99)
HCG SERPL-ACNC: <4 MIU/ML — SIGNIFICANT CHANGE UP
HCT VFR BLD CALC: 38.7 % — SIGNIFICANT CHANGE UP (ref 34.5–45)
HGB BLD-MCNC: 12.3 G/DL — SIGNIFICANT CHANGE UP (ref 11.5–15.5)
IMM GRANULOCYTES NFR BLD AUTO: 0.3 % — SIGNIFICANT CHANGE UP (ref 0–0.9)
INR BLD: 0.97 RATIO — SIGNIFICANT CHANGE UP (ref 0.85–1.18)
LYMPHOCYTES # BLD AUTO: 2.47 K/UL — SIGNIFICANT CHANGE UP (ref 1–3.3)
LYMPHOCYTES # BLD AUTO: 32 % — SIGNIFICANT CHANGE UP (ref 13–44)
MCHC RBC-ENTMCNC: 30.8 PG — SIGNIFICANT CHANGE UP (ref 27–34)
MCHC RBC-ENTMCNC: 31.8 GM/DL — LOW (ref 32–36)
MCV RBC AUTO: 97 FL — SIGNIFICANT CHANGE UP (ref 80–100)
MONOCYTES # BLD AUTO: 0.89 K/UL — SIGNIFICANT CHANGE UP (ref 0–0.9)
MONOCYTES NFR BLD AUTO: 11.5 % — SIGNIFICANT CHANGE UP (ref 2–14)
NEUTROPHILS # BLD AUTO: 4.18 K/UL — SIGNIFICANT CHANGE UP (ref 1.8–7.4)
NEUTROPHILS NFR BLD AUTO: 54.2 % — SIGNIFICANT CHANGE UP (ref 43–77)
PLATELET # BLD AUTO: 362 K/UL — SIGNIFICANT CHANGE UP (ref 150–400)
POTASSIUM SERPL-MCNC: 4 MMOL/L — SIGNIFICANT CHANGE UP (ref 3.5–5.3)
POTASSIUM SERPL-SCNC: 4 MMOL/L — SIGNIFICANT CHANGE UP (ref 3.5–5.3)
PROT SERPL-MCNC: 7.2 G/DL — SIGNIFICANT CHANGE UP (ref 6.6–8.7)
PROTHROM AB SERPL-ACNC: 10.8 SEC — SIGNIFICANT CHANGE UP (ref 9.5–13)
RBC # BLD: 3.99 M/UL — SIGNIFICANT CHANGE UP (ref 3.8–5.2)
RBC # FLD: 11.8 % — SIGNIFICANT CHANGE UP (ref 10.3–14.5)
SODIUM SERPL-SCNC: 139 MMOL/L — SIGNIFICANT CHANGE UP (ref 135–145)
WBC # BLD: 7.72 K/UL — SIGNIFICANT CHANGE UP (ref 3.8–10.5)
WBC # FLD AUTO: 7.72 K/UL — SIGNIFICANT CHANGE UP (ref 3.8–10.5)

## 2024-01-15 PROCEDURE — 99285 EMERGENCY DEPT VISIT HI MDM: CPT

## 2024-01-15 PROCEDURE — 99284 EMERGENCY DEPT VISIT MOD MDM: CPT

## 2024-01-15 RX ORDER — SODIUM CHLORIDE 9 MG/ML
1000 INJECTION, SOLUTION INTRAVENOUS ONCE
Refills: 0 | Status: COMPLETED | OUTPATIENT
Start: 2024-01-15 | End: 2024-01-15

## 2024-01-15 RX ADMIN — SODIUM CHLORIDE 1000 MILLILITER(S): 9 INJECTION, SOLUTION INTRAVENOUS at 20:23

## 2024-01-15 RX ADMIN — SODIUM CHLORIDE 1000 MILLILITER(S): 9 INJECTION, SOLUTION INTRAVENOUS at 21:30

## 2024-01-15 NOTE — ED STATDOCS - PATIENT PORTAL LINK FT
You can access the FollowMyHealth Patient Portal offered by [facility] by registering at the following website: [email or web address]. By joining [employer]s FollowMyHealth portal, you will also be able to view your health information using other applications (apps) compatible with our system. You can access the FollowMyHealth Patient Portal offered by [facility] by registering at the following website: [email or web address]. By joining [employer]’s FollowMyHealth portal, you will also be able to view your health information using other applications (apps) compatible with our system.

## 2024-01-15 NOTE — ED ADULT TRIAGE NOTE - CHIEF COMPLAINT QUOTE
45 days ago had tummy tuck. belly button not healing, having pus coming out of site. surgery done in [place]. took amoxicillin not helping

## 2024-01-15 NOTE — ED STATDOCS - NSFOLLOWUPCLINICS_GEN_ALL_ED_FT
[facility] Acute Care Surgery  Acute Care Surgery  [address] [place] [id number]  Phone: [phone]  Fax:      [facility] Acute Care Surgery  Acute Care Surgery  [address] [place] [address]  Phone: [phone]  Fax:

## 2024-01-15 NOTE — ED STATDOCS - PHYSICAL EXAMINATION
Constitutional: Awake, Alert, non-toxic. No acute distress.  HEAD: Normocephalic, atraumatic.   EYES: PERRL, EOM intact, conjunctiva and sclera are clear bilaterally.  ENT: External ears normal. No rhinorrhea, no tracheal deviation   NECK: Supple, non-tender  CARDIOVASCULAR: regular rate and rhythm.  RESPIRATORY: Normal respiratory effort; breath sounds CTAB, no wheezes, rhonchi, or rales. Speaking in full sentences. No accessory muscle use.   ABDOMEN: Soft; TTP to left of umbilicus, non-distended. No rebound or guarding.   MSK:  no lower extremity edema, no deformities  SKIN: Warm, dry, umbilical wound is open with clear drainage, no purulence noted  NEURO: A&O x3. Sensory and motor functions are grossly intact. Speech is normal. No facial droop.  PSYCH: Appearance and judgement seem appropriate for gender and age.

## 2024-01-15 NOTE — ED STATDOCS - NSFOLLOWUPINSTRUCTIONS_ED_ALL_ED_FT
Follow up with PCP 1-2 days   Follow up with Surgery clinic within 1-2 days   Return if new or worsening symptoms     Abdominal Pain    Many things can cause abdominal pain. Many times, abdominal pain is not caused by a disease and will improve without treatment. Your health care provider will do a physical exam to determine if there is a dangerous cause of your pain; blood tests and imaging may help determine the cause of your pain. However, in many cases, no cause may be found and you may need further testing as an outpatient. Monitor your abdominal pain for any changes.     SEEK IMMEDIATE MEDICAL CARE IF YOU HAVE ANY OF THE FOLLOWING SYMPTOMS: worsening abdominal pain, uncontrollable vomiting, profuse diarrhea, inability to have bowel movements or pass gas, black or bloody stools, fever accompanying chest pain or back pain, or fainting. These symptoms may represent a serious problem that is an emergency. Do not wait to see if the symptoms will go away. Get medical help right away. Call 911 and do not drive yourself to the hospital.

## 2024-01-15 NOTE — ED ADULT NURSE NOTE - OBJECTIVE STATEMENT
pt stated that came to the ED because 45 days ago had tummy tuck and belly button not healing with having pus coming out of surgical site.  surgery done in [place].  pt also stated that took amoxicillin but not helping pt stated that came to the ED because 45 days ago had tummy tuck and belly button not healing with having pus coming out of surgical site.  surgery done in [place].  pt also stated that took amoxicillin but not helping

## 2024-01-15 NOTE — ED ADULT TRIAGE NOTE - TEMPERATURE IN CELSIUS (DEGREES C)
Elmer L Linboom, MD Andrea E Habermann, RN; YESSENIA Mccormick Nurse Msg Pool Just now (12:14 PM)      Cipro 500 mg bid for 5 days    Routing comment       
Called pt to discuss further. States on Tuesday AM she started with urinary frequency/urgency and bladder pressure. Also notes some mild, infrequent abdominal cramping. Denies blood in her urine, fever or back pain.  Pt does not have hx of UTIs.    Please advise if patient could come in for UA?  
Informed patient of recommendation below. She verbalized understanding and verified pharmacy of choice. Encourage patient to reach out to office after course of antibiotics if there is no improvements in symptoms, she is agreeable.     Script sent to pharmacy.  
Patient called and stated that she has had pressure and frequency with urination since Monday.   Please call.  Thank you.  
37.2

## 2024-01-15 NOTE — ED STATDOCS - CLINICAL SUMMARY MEDICAL DECISION MAKING FREE TEXT BOX
Patient with concern for postoperative wound infection versus delayed wound closure.  Site does not appear to be infected though with surrounding pain, will check CT scan for seroma versus abscess.  Will check lab work, imaging.

## 2024-01-15 NOTE — ED ADULT NURSE NOTE - CHIEF COMPLAINT QUOTE
45 days ago had tummy tuck. belly button not healing, having pus coming out of site. surgery done in [place]. took amoxicillin not helping 45 days ago had tummy tuck. belly button not healing, having pus coming out of site. surgery done in [place]. took amoxicillin not helping

## 2024-01-15 NOTE — ED ADULT NURSE NOTE - NSFALLUNIVINTERV_ED_ALL_ED
Bed/Stretcher in lowest position, wheels locked, appropriate side rails in place/Call bell, personal items and telephone in reach/Instruct patient to call for assistance before getting out of bed/chair/stretcher/Non-slip footwear applied when patient is off stretcher/[place] to call system/Physically safe environment - no spills, clutter or unnecessary equipment/Purposeful proactive rounding/Room/bathroom lighting operational, light cord in reach Bed/Stretcher in lowest position, wheels locked, appropriate side rails in place/Call bell, personal items and telephone in reach/Instruct patient to call for assistance before getting out of bed/chair/stretcher/Non-slip footwear applied when patient is off stretcher/[place] to call system/Physically safe environment - no spills, clutter or unnecessary equipment/Purposeful proactive rounding/Room/bathroom lighting operational, light cord in reach

## 2024-01-15 NOTE — ED STATDOCS - ATTENDING APP SHARED VISIT CONTRIBUTION OF CARE
I, Tiot Rice, performed the initial face to face bedside interview with this patient regarding history of present illness, review of symptoms and relevant past medical, social and family history.  I completed an independent physical examination.  I was the initial provider who evaluated this patient. I have signed out the follow up of any pending tests (i.e. labs, radiological studies) to the ACP.  I have communicated the patient’s plan of care and disposition with the ACP. I, Tito Rice, performed the initial face to face bedside interview with this patient regarding history of present illness, review of symptoms and relevant past medical, social and family history.  I completed an independent physical examination.  I was the initial provider who evaluated this patient. I have signed out the follow up of any pending tests (i.e. labs, radiological studies) to the ACP.  I have communicated the patient’s plan of care and disposition with the ACP. I, Tito Rice, performed the initial face to face bedside interview with this patient regarding history of present illness, review of symptoms and relevant past medical, social and family history.  I completed an independent physical examination.  I was the initial provider who evaluated this patient. I have signed out the follow up of any pending tests (i.e. labs, radiological studies) to the ACP. I have communicated the patient’s plan of care and disposition with the ACP.

## 2024-01-15 NOTE — ED STATDOCS - OBJECTIVE STATEMENT
Patient with past medical history of tummy tuck procedure done in [place] on December 4 is presenting with drainage from umbilical wound.  States that on December 18 she noted yellow drainage to the umbilicus.  Was started on amoxicillin by her primary care doctor.  Completed the course of amoxicillin but states the discharge still present though less yellow so came to ED for evaluation.  Also noted pain around the site starting yesterday.  No redness.  No fevers.  Still tolerating p.o.  No change in bowel movements. Patient with past medical history of tummy tuck procedure done in [place] on December 4 is presenting with drainage from umbilical wound.  States that on December 18 she noted yellow drainage to the umbilicus.  Was started on amoxicillin by her primary care doctor.  Completed the course of amoxicillin but states the discharge still present though less yellow so came to ED for evaluation.  Also noted pain around the site starting yesterday.  No redness.  No fevers.  Still tolerating p.o.  No change in bowel movements.

## 2024-01-16 PROCEDURE — 85025 COMPLETE CBC W/AUTO DIFF WBC: CPT

## 2024-01-16 PROCEDURE — 74177 CT ABD & PELVIS W/CONTRAST: CPT | Mod: 26,MA

## 2024-01-16 PROCEDURE — 80053 COMPREHEN METABOLIC PANEL: CPT

## 2024-01-16 PROCEDURE — 96360 HYDRATION IV INFUSION INIT: CPT | Mod: XU

## 2024-01-16 PROCEDURE — T1013: CPT

## 2024-01-16 PROCEDURE — 36415 COLL VENOUS BLD VENIPUNCTURE: CPT

## 2024-01-16 PROCEDURE — 84702 CHORIONIC GONADOTROPIN TEST: CPT

## 2024-01-16 PROCEDURE — 85610 PROTHROMBIN TIME: CPT

## 2024-01-16 PROCEDURE — 74177 CT ABD & PELVIS W/CONTRAST: CPT | Mod: MA

## 2024-01-16 PROCEDURE — 85730 THROMBOPLASTIN TIME PARTIAL: CPT

## 2024-01-16 PROCEDURE — 99284 EMERGENCY DEPT VISIT MOD MDM: CPT | Mod: 25

## 2024-02-29 NOTE — ED ADULT NURSE NOTE - INTERPRETER'S NAME
Pt done talking with DAVID, DAVID is requesting the we EMC the patient and get him admitted to [facility] 4E.    eugenia esquivel

## 2024-09-08 ENCOUNTER — EMERGENCY (EMERGENCY)
Facility: HOSPITAL | Age: 49
LOS: 1 days | Discharge: HOME | End: 2024-09-08
Attending: EMERGENCY MEDICINE
Payer: SELF-PAY

## 2024-09-08 VITALS
DIASTOLIC BLOOD PRESSURE: 82 MMHG | SYSTOLIC BLOOD PRESSURE: 136 MMHG | OXYGEN SATURATION: 99 % | TEMPERATURE: 98 F | HEART RATE: 78 BPM | RESPIRATION RATE: 18 BRPM | WEIGHT: 184.75 LBS | HEIGHT: 57 IN

## 2024-09-08 DIAGNOSIS — Z98.891 HISTORY OF UTERINE SCAR FROM PREVIOUS SURGERY: Chronic | ICD-10-CM

## 2024-09-08 LAB
ALBUMIN SERPL ELPH-MCNC: 3.9 G/DL — SIGNIFICANT CHANGE UP (ref 3.3–5.2)
ALP SERPL-CCNC: 55 U/L — SIGNIFICANT CHANGE UP (ref 40–120)
ALT FLD-CCNC: 18 U/L — SIGNIFICANT CHANGE UP
ANION GAP SERPL CALC-SCNC: 9 MMOL/L — SIGNIFICANT CHANGE UP (ref 5–17)
AST SERPL-CCNC: 18 U/L — SIGNIFICANT CHANGE UP
BASOPHILS # BLD AUTO: 0.03 K/UL — SIGNIFICANT CHANGE UP (ref 0–0.2)
BASOPHILS NFR BLD AUTO: 0.5 % — SIGNIFICANT CHANGE UP (ref 0–2)
BILIRUB SERPL-MCNC: 0.3 MG/DL — LOW (ref 0.4–2)
BUN SERPL-MCNC: 8.8 MG/DL — SIGNIFICANT CHANGE UP (ref 8–20)
CALCIUM SERPL-MCNC: 9.4 MG/DL — SIGNIFICANT CHANGE UP (ref 8.4–10.5)
CHLORIDE SERPL-SCNC: 102 MMOL/L — SIGNIFICANT CHANGE UP (ref 96–108)
CO2 SERPL-SCNC: 26 MMOL/L — SIGNIFICANT CHANGE UP (ref 22–29)
CREAT SERPL-MCNC: 0.43 MG/DL — LOW (ref 0.5–1.3)
EGFR: 119 ML/MIN/1.73M2 — SIGNIFICANT CHANGE UP
EOSINOPHIL # BLD AUTO: 0.03 K/UL — SIGNIFICANT CHANGE UP (ref 0–0.5)
EOSINOPHIL NFR BLD AUTO: 0.5 % — SIGNIFICANT CHANGE UP (ref 0–6)
GLUCOSE SERPL-MCNC: 90 MG/DL — SIGNIFICANT CHANGE UP (ref 70–99)
HCG SERPL-ACNC: <4 MIU/ML — SIGNIFICANT CHANGE UP
HCT VFR BLD CALC: 36.2 % — SIGNIFICANT CHANGE UP (ref 34.5–45)
HGB BLD-MCNC: 12.1 G/DL — SIGNIFICANT CHANGE UP (ref 11.5–15.5)
IMM GRANULOCYTES NFR BLD AUTO: 0.2 % — SIGNIFICANT CHANGE UP (ref 0–0.9)
LYMPHOCYTES # BLD AUTO: 1.81 K/UL — SIGNIFICANT CHANGE UP (ref 1–3.3)
LYMPHOCYTES # BLD AUTO: 28.3 % — SIGNIFICANT CHANGE UP (ref 13–44)
MCHC RBC-ENTMCNC: 32.4 PG — SIGNIFICANT CHANGE UP (ref 27–34)
MCHC RBC-ENTMCNC: 33.4 GM/DL — SIGNIFICANT CHANGE UP (ref 32–36)
MCV RBC AUTO: 97.1 FL — SIGNIFICANT CHANGE UP (ref 80–100)
MONOCYTES # BLD AUTO: 0.76 K/UL — SIGNIFICANT CHANGE UP (ref 0–0.9)
MONOCYTES NFR BLD AUTO: 11.9 % — SIGNIFICANT CHANGE UP (ref 2–14)
NEUTROPHILS # BLD AUTO: 3.76 K/UL — SIGNIFICANT CHANGE UP (ref 1.8–7.4)
NEUTROPHILS NFR BLD AUTO: 58.6 % — SIGNIFICANT CHANGE UP (ref 43–77)
PLATELET # BLD AUTO: 333 K/UL — SIGNIFICANT CHANGE UP (ref 150–400)
POTASSIUM SERPL-MCNC: 4.4 MMOL/L — SIGNIFICANT CHANGE UP (ref 3.5–5.3)
POTASSIUM SERPL-SCNC: 4.4 MMOL/L — SIGNIFICANT CHANGE UP (ref 3.5–5.3)
PROT SERPL-MCNC: 6.7 G/DL — SIGNIFICANT CHANGE UP (ref 6.6–8.7)
RAPID RVP RESULT: DETECTED
RBC # BLD: 3.73 M/UL — LOW (ref 3.8–5.2)
RBC # FLD: 11.9 % — SIGNIFICANT CHANGE UP (ref 10.3–14.5)
RV+EV RNA SPEC QL NAA+PROBE: DETECTED
S PYO DNA THROAT QL NAA+PROBE: SIGNIFICANT CHANGE UP
SARS-COV-2 RNA SPEC QL NAA+PROBE: SIGNIFICANT CHANGE UP
SODIUM SERPL-SCNC: 137 MMOL/L — SIGNIFICANT CHANGE UP (ref 135–145)
WBC # BLD: 6.4 K/UL — SIGNIFICANT CHANGE UP (ref 3.8–10.5)
WBC # FLD AUTO: 6.4 K/UL — SIGNIFICANT CHANGE UP (ref 3.8–10.5)

## 2024-09-08 PROCEDURE — 99284 EMERGENCY DEPT VISIT MOD MDM: CPT

## 2024-09-08 PROCEDURE — 99284 EMERGENCY DEPT VISIT MOD MDM: CPT | Mod: 25

## 2024-09-08 PROCEDURE — 0225U NFCT DS DNA&RNA 21 SARSCOV2: CPT

## 2024-09-08 PROCEDURE — 84702 CHORIONIC GONADOTROPIN TEST: CPT

## 2024-09-08 PROCEDURE — 80053 COMPREHEN METABOLIC PANEL: CPT

## 2024-09-08 PROCEDURE — 87651 STREP A DNA AMP PROBE: CPT

## 2024-09-08 PROCEDURE — 36415 COLL VENOUS BLD VENIPUNCTURE: CPT

## 2024-09-08 PROCEDURE — 85025 COMPLETE CBC W/AUTO DIFF WBC: CPT

## 2024-09-08 PROCEDURE — 96374 THER/PROPH/DIAG INJ IV PUSH: CPT

## 2024-09-08 PROCEDURE — T1013: CPT

## 2024-09-08 PROCEDURE — 87798 DETECT AGENT NOS DNA AMP: CPT

## 2024-09-08 RX ORDER — ACETAMINOPHEN 325 MG/1
975 TABLET ORAL ONCE
Refills: 0 | Status: COMPLETED | OUTPATIENT
Start: 2024-09-08 | End: 2024-09-08

## 2024-09-08 RX ORDER — SODIUM CHLORIDE 9 MG/ML
1000 INJECTION INTRAMUSCULAR; INTRAVENOUS; SUBCUTANEOUS ONCE
Refills: 0 | Status: COMPLETED | OUTPATIENT
Start: 2024-09-08 | End: 2024-09-08

## 2024-09-08 RX ORDER — METOCLOPRAMIDE HCL 5 MG
10 TABLET ORAL ONCE
Refills: 0 | Status: COMPLETED | OUTPATIENT
Start: 2024-09-08 | End: 2024-09-08

## 2024-09-08 RX ADMIN — ACETAMINOPHEN 975 MILLIGRAM(S): 325 TABLET ORAL at 14:05

## 2024-09-08 RX ADMIN — Medication 10 MILLIGRAM(S): at 14:05

## 2024-09-08 RX ADMIN — SODIUM CHLORIDE 1000 MILLILITER(S): 9 INJECTION INTRAMUSCULAR; INTRAVENOUS; SUBCUTANEOUS at 14:10

## 2024-09-08 NOTE — ED ADULT NURSE NOTE - OBJECTIVE STATEMENT
A&O x4,  used, pt reports left sided ear pain that goes down to the neck x 3 days. + chills. Denies sick contacts, cough, congestion. Safety precautions maintained.

## 2024-09-08 NOTE — ED PROVIDER NOTE - PHYSICAL EXAMINATION
Constitutional: Awake, alert and oriented. In no acute distress. Well appearing.  HEENT: NC/AT. Moist mucous membranes. bilateral TM pearly gray. No mastoid tenderness bilaterally. Uvula midline. No tonsillar enlargement. No stridor/drooling/trismus.  Eyes: No scleral icterus. EOMI.  Neck:. Soft and supple. Full ROM without pain. No meningeal signs.   Cardiac: Regular rate and regular rhythm. +S1/S2. Peripheral pulses 2+ and symmetric. No LE edema.  Respiratory: Speaking in full sentences. No evidence of respiratory distress. No wheezes, rales or rhonchi.  Abdomen: Soft, non-distended and non tender Normal bowel sounds in all 4 quadrants. No guarding or rebound. no CVAT  Back: Spine midline and non-tender.   Skin: No rashes, abrasions or lacerations.  Lymph: No cervical lymphadenopathy.  Neuro: Awake, alert & oriented x 3. Moves all extremities symmetrically. Negative pronator drift, strength 5/5 all upper and lower extremities, sensation to light touch intact throughout upper/ lower extremities and face, finger to nose coordination intact, cranial nerves 2-12 intact  Psych: calm, cooperative, normal affect

## 2024-09-08 NOTE — ED PROVIDER NOTE - CARE PLAN
Principal Discharge DX:	Rhinovirus  Secondary Diagnosis:	Headache  Secondary Diagnosis:	Throat pain   1

## 2024-09-08 NOTE — ED PROVIDER NOTE - OBJECTIVE STATEMENT
47 y/o  F with no reported PMHx/PSHx p/w c/o throat pain, left sided ear/neck pain/left sided atraumatic  headaches x 3 days. No fever/chills, neck stiffness, stridor/drooling/trismus, hoarseness,  rash, n/v, sob, chest pain, cough, dizziness, changes in vision/diplopia, numbness over face/extremities, slurred speech, ataxia, abdominal pain, back pain, urinary symptoms, weakness, and with no other c/o. No hx of recent travel and no exposure with sick contacts. No trauma/injury. No LOC. No anticoagulants use. Denies smoking.

## 2024-09-08 NOTE — ED ADULT TRIAGE NOTE - CHIEF COMPLAINT QUOTE
Sore throat, left ear pain that radiates to left side of neck X 2 days. Last took excedrine last night.

## 2024-09-08 NOTE — ED PROVIDER NOTE - CARE PROVIDER_API CALL
Marcos Samaniego  Head/Neck Surgery  [address] [place] [address], [address]  [place] [address]  Phone: [phone]  Fax: [phone]  Follow Up Time: Routine

## 2024-09-08 NOTE — ED PROVIDER NOTE - PATIENT PORTAL LINK FT
You can access the FollowMyHealth Patient Portal offered by [facility] by registering at the following website: [email or web address]. By joining [employer]’s FollowMyHealth portal, you will also be able to view your health information using other applications (apps) compatible with our system.

## 2024-09-08 NOTE — ED PROVIDER NOTE - ATTENDING APP SHARED VISIT CONTRIBUTION OF CARE
I, Todd Stevens, performed a face to face bedside interview with this patient regarding history of present illness, and completed an independent physical examination. I personally made/approved the management plan and take responsibility for the patient management. I have communicated the patient’s plan of care and disposition with the ACP.  48-year-old presents with 3 days of left-sided throat pain, ear pain, neck pain.  No fevers, difficulty swallowing, hoarse voice, neck stiffness  Gen: NAD, well appearing  HEENT:  mild erythema to the left tonsil, airway intact, left-sided anterior cervical lymphadenopathy, neck supple  CV: RRR  Pul: CTA b/l  Abd: Soft, non-distended, non-tender  Neuro: no focal deficits  Pt  is well-appearing, no nuchal rigidity, airway intact, the neck pain is due to lymphadenopathy, likely viral pharyngitis, stable for discharge

## 2024-09-08 NOTE — ED ADULT NURSE NOTE - NSFALLUNIVINTERV_ED_ALL_ED
Bed/Stretcher in lowest position, wheels locked, appropriate side rails in place/Call bell, personal items and telephone in reach/Instruct patient to call for assistance before getting out of bed/chair/stretcher/Non-slip footwear applied when patient is off stretcher/[place] to call system/Physically safe environment - no spills, clutter or unnecessary equipment/Purposeful proactive rounding/Room/bathroom lighting operational, light cord in reach

## 2024-09-08 NOTE — ED PROVIDER NOTE - NSFOLLOWUPINSTRUCTIONS_ED_ALL_ED_FT
1)Follow up with your PCP in 1 week  2)Follow up with ENT clinic within 1 week  Return to the emergency room if you are experiencing any new or worsening symptoms    Viral Respiratory Infection    A viral respiratory infection is an illness that affects parts of the body used for breathing, like the lungs, nose, and throat. It is caused by a germ called a virus. Symptoms can include runny nose, coughing, sneezing, fatigue, body aches, sore throat, fever, or headache. Over the counter medicine can be used to manage the symptoms but the infection typically goes away on its own in 5 to 10 days.     SEEK IMMEDIATE MEDICAL CARE IF YOU HAVE ANY OF THE FOLLOWING SYMPTOMS: shortness of breath, chest pain, fever over 10 days, or lightheadedness/dizziness.      Headache    A headache is pain or discomfort felt around the head or neck area. The specific cause of a headache may not be found as there are many types including tension headaches, migraine headaches, and cluster headaches. Watch your condition for any changes. Things you can do to manage your pain include taking over the counter and prescription medications as instructed by your health care provider, lying down in a dark quiet room, limiting stress, getting regular sleep, and refraining from alcohol and tobacco products.    SEEK IMMEDIATE MEDICAL CARE IF YOU HAVE ANY OF THE FOLLOWING SYMPTOMS: fever, vomiting, stiff neck, loss of vision, problems with speech, muscle weakness, loss of balance, trouble walking, passing out, or confusion.

## 2024-09-08 NOTE — ED PROVIDER NOTE - CLINICAL SUMMARY MEDICAL DECISION MAKING FREE TEXT BOX
47 y/o  F with no reported PMHx/PSHx p/w c/o throat pain, left sided neck pain/left sided atraumatic headaches x 3 days. Will rule out strep vs viral illness. Strep negative. RSV/COVID: (+) rhino/enterovirus.  Pt reports improvement of symptoms in the ED after IVF/tylenol. Instructed to f/u with PCP/ENT in 1 week. Strict ED return precautions given if any new or worsening symptoms.

## 2024-09-09 ENCOUNTER — NON-APPOINTMENT (OUTPATIENT)
Age: 49
End: 2024-09-09

## 2024-09-10 DIAGNOSIS — H92.02 OTALGIA, LEFT EAR: ICD-10-CM

## 2024-09-10 DIAGNOSIS — M54.2 CERVICALGIA: ICD-10-CM

## 2024-09-10 DIAGNOSIS — J02.9 ACUTE PHARYNGITIS, UNSPECIFIED: ICD-10-CM

## 2024-09-10 DIAGNOSIS — B34.8 OTHER VIRAL INFECTIONS OF UNSPECIFIED SITE: ICD-10-CM

## 2025-06-09 ENCOUNTER — EMERGENCY (EMERGENCY)
Facility: HOSPITAL | Age: 50
LOS: 1 days | End: 2025-06-09
Attending: EMERGENCY MEDICINE
Payer: SELF-PAY

## 2025-06-09 VITALS
WEIGHT: 195.11 LBS | OXYGEN SATURATION: 96 % | RESPIRATION RATE: 16 BRPM | HEART RATE: 104 BPM | DIASTOLIC BLOOD PRESSURE: 83 MMHG | SYSTOLIC BLOOD PRESSURE: 131 MMHG | TEMPERATURE: 98 F

## 2025-06-09 DIAGNOSIS — Z98.891 HISTORY OF UTERINE SCAR FROM PREVIOUS SURGERY: Chronic | ICD-10-CM

## 2025-06-09 LAB
ALBUMIN SERPL ELPH-MCNC: 4.4 G/DL — SIGNIFICANT CHANGE UP (ref 3.3–5.2)
ALP SERPL-CCNC: 71 U/L — SIGNIFICANT CHANGE UP (ref 40–120)
ALT FLD-CCNC: 33 U/L — HIGH
ANION GAP SERPL CALC-SCNC: 16 MMOL/L — SIGNIFICANT CHANGE UP (ref 5–17)
AST SERPL-CCNC: 29 U/L — SIGNIFICANT CHANGE UP
BASOPHILS # BLD AUTO: 0.03 K/UL — SIGNIFICANT CHANGE UP (ref 0–0.2)
BASOPHILS NFR BLD AUTO: 0.3 % — SIGNIFICANT CHANGE UP (ref 0–2)
BILIRUB SERPL-MCNC: 0.4 MG/DL — SIGNIFICANT CHANGE UP (ref 0.4–2)
BUN SERPL-MCNC: 8.4 MG/DL — SIGNIFICANT CHANGE UP (ref 8–20)
CALCIUM SERPL-MCNC: 9.6 MG/DL — SIGNIFICANT CHANGE UP (ref 8.4–10.5)
CHLORIDE SERPL-SCNC: 99 MMOL/L — SIGNIFICANT CHANGE UP (ref 96–108)
CO2 SERPL-SCNC: 23 MMOL/L — SIGNIFICANT CHANGE UP (ref 22–29)
CREAT SERPL-MCNC: 0.51 MG/DL — SIGNIFICANT CHANGE UP (ref 0.5–1.3)
EGFR: 114 ML/MIN/1.73M2 — SIGNIFICANT CHANGE UP
EGFR: 114 ML/MIN/1.73M2 — SIGNIFICANT CHANGE UP
EOSINOPHIL # BLD AUTO: 0 K/UL — SIGNIFICANT CHANGE UP (ref 0–0.5)
EOSINOPHIL NFR BLD AUTO: 0 % — SIGNIFICANT CHANGE UP (ref 0–6)
GLUCOSE SERPL-MCNC: 96 MG/DL — SIGNIFICANT CHANGE UP (ref 70–99)
HCG SERPL-ACNC: <4 MIU/ML — SIGNIFICANT CHANGE UP
HCT VFR BLD CALC: 41.5 % — SIGNIFICANT CHANGE UP (ref 34.5–45)
HGB BLD-MCNC: 14.1 G/DL — SIGNIFICANT CHANGE UP (ref 11.5–15.5)
IMM GRANULOCYTES # BLD AUTO: 0.04 K/UL — SIGNIFICANT CHANGE UP (ref 0–0.07)
IMM GRANULOCYTES NFR BLD AUTO: 0.3 % — SIGNIFICANT CHANGE UP (ref 0–0.9)
LIDOCAIN IGE QN: 33 U/L — SIGNIFICANT CHANGE UP (ref 22–51)
LYMPHOCYTES # BLD AUTO: 0.78 K/UL — LOW (ref 1–3.3)
LYMPHOCYTES NFR BLD AUTO: 6.6 % — LOW (ref 13–44)
MCHC RBC-ENTMCNC: 32.9 PG — SIGNIFICANT CHANGE UP (ref 27–34)
MCHC RBC-ENTMCNC: 34 G/DL — SIGNIFICANT CHANGE UP (ref 32–36)
MCV RBC AUTO: 97 FL — SIGNIFICANT CHANGE UP (ref 80–100)
MONOCYTES # BLD AUTO: 0.58 K/UL — SIGNIFICANT CHANGE UP (ref 0–0.9)
MONOCYTES NFR BLD AUTO: 4.9 % — SIGNIFICANT CHANGE UP (ref 2–14)
NEUTROPHILS # BLD AUTO: 10.34 K/UL — HIGH (ref 1.8–7.4)
NEUTROPHILS NFR BLD AUTO: 87.9 % — HIGH (ref 43–77)
NRBC # BLD AUTO: 0 K/UL — SIGNIFICANT CHANGE UP (ref 0–0)
NRBC # FLD: 0 K/UL — SIGNIFICANT CHANGE UP (ref 0–0)
NRBC BLD AUTO-RTO: 0 /100 WBCS — SIGNIFICANT CHANGE UP (ref 0–0)
PLATELET # BLD AUTO: 406 K/UL — HIGH (ref 150–400)
PMV BLD: 9.8 FL — SIGNIFICANT CHANGE UP (ref 7–13)
POTASSIUM SERPL-MCNC: 4 MMOL/L — SIGNIFICANT CHANGE UP (ref 3.5–5.3)
POTASSIUM SERPL-SCNC: 4 MMOL/L — SIGNIFICANT CHANGE UP (ref 3.5–5.3)
PROT SERPL-MCNC: 8 G/DL — SIGNIFICANT CHANGE UP (ref 6.6–8.7)
RBC # BLD: 4.28 M/UL — SIGNIFICANT CHANGE UP (ref 3.8–5.2)
RBC # FLD: 11.9 % — SIGNIFICANT CHANGE UP (ref 10.3–14.5)
SODIUM SERPL-SCNC: 138 MMOL/L — SIGNIFICANT CHANGE UP (ref 135–145)
WBC # BLD: 11.77 K/UL — HIGH (ref 3.8–10.5)
WBC # FLD AUTO: 11.77 K/UL — HIGH (ref 3.8–10.5)

## 2025-06-09 PROCEDURE — 99285 EMERGENCY DEPT VISIT HI MDM: CPT

## 2025-06-09 RX ORDER — ACETAMINOPHEN 500 MG/5ML
1000 LIQUID (ML) ORAL ONCE
Refills: 0 | Status: COMPLETED | OUTPATIENT
Start: 2025-06-09 | End: 2025-06-09

## 2025-06-09 RX ORDER — ONDANSETRON HCL/PF 4 MG/2 ML
4 VIAL (ML) INJECTION ONCE
Refills: 0 | Status: COMPLETED | OUTPATIENT
Start: 2025-06-09 | End: 2025-06-09

## 2025-06-09 RX ADMIN — Medication 400 MILLIGRAM(S): at 22:56

## 2025-06-09 RX ADMIN — Medication 4 MILLIGRAM(S): at 22:56

## 2025-06-09 RX ADMIN — Medication 20 MILLIGRAM(S): at 22:56

## 2025-06-09 RX ADMIN — Medication 1000 MILLILITER(S): at 22:56

## 2025-06-09 NOTE — ED PROVIDER NOTE - OBJECTIVE STATEMENT
48 yo female PMHx abdominoplasty (2023) presents to ED c/o vomiting. Patient reports first injected Wegovy at 8am, started vomiting as 12pm. 48 yo female PMHx abdominoplasty (2023) presents to ED c/o vomiting x10 hours. Patient is hesitant to give information, but states bought Wegovy from a friend and injected first dose at 8am today. Patient injected "the highest dose," because she did not know.  Since 12pm has had approx. 15 episodes of NBNB emesis. Did not self medicate PTA. No other complaints at this time. 50 yo female PMHx abdominoplasty (2023) presents to ED c/o vomiting x10 hours. Patient is hesitant to give information, but states bought Wegovy from a friend and injected first dose at 8am today. Patient injected "the highest dose," because she did not know.  Since 12pm has had approx. 15 episodes of NBNB emesis. Last normal BM yesterday morning. Did not self medicate PTA. No other complaints at this time.

## 2025-06-09 NOTE — ED PROVIDER NOTE - PHYSICAL EXAMINATION
Gen: Nontoxic, but uncomfortable appearing.  Skin: Warm and dry as visualized.  Head: NC/AT.  Eyes: PERRLA. EOMI.  Neck: Supple, FROM. Trachea midline.   Resp: No distress.  Cardio: Well perfused.  Abd: Nondistended. Soft, diffuse tenderness. No McBurney's tenderness. No guarding.   Ext: No deformities. MAEx4. FROM.   Neuro: A&Ox3. Appears nonfocal.   Psych: Normal affect and mood.

## 2025-06-09 NOTE — ED PROVIDER NOTE - PATIENT PORTAL LINK FT
You can access the FollowMyHealth Patient Portal offered by [facility] by registering at the following website: [email or web address]. By joining [facility]’s FollowMyHealth portal, you will also be able to view your health information using other applications (apps) compatible with our system.

## 2025-06-09 NOTE — ED PROVIDER NOTE - ATTENDING APP SHARED VISIT CONTRIBUTION OF CARE
I have personally seen and examined this patient. I have fully participated in the care of this patient. I have made amendments to the documentation where appropriate and otherwise agree with the history, physical exam, and plan as documented by the ACP.    Likely adverse reaction to medication but given acuity and severity of symptoms will get CT to evaluate for any acute surgical pathology. Check routine labs, provide symptom control.

## 2025-06-09 NOTE — ED PROVIDER NOTE - NSFOLLOWUPINSTRUCTIONS_ED_ALL_ED_FT
- Prescription sent to pharmacy.  - Increase fluids.  - Bertie diet as tolerated. Avoid citrus based food/drink, spicy/greasy foods, milk, caffeine.   - Please call to schedule follow up appointment with your primary care physician within 24-48 hours.  - Please seek immediate medical attention for any new/worsening, signs/symptoms, or concerns.    Feel better!     - Receta enviada a farmacia.  - Aumentar los líquidos.  - Dieta blanda según tolerancia. Evite alimentos/bebidas a base de cítricos, alimentos picantes/grasosos, leche, cafeína.  - Llame para programar marlena zia de seguimiento con sahu médico de atención primaria dentro de las 24 a 48 horas.  - Por favor, busque atención médica inmediata para cualquier nuevo/empeoramiento, signos/síntomas o inquietudes.    ¡Sentirse mejor!      Náuseas y vómitos agudos    LO QUE NECESITA SABER:    ¿Qué son las náuseas y vómitos agudos?Las náuseas y los vómitos agudos comienzan de forma repentina, empeoran rápidamente y thompson un período breve de tiempo.    ¿Cuáles son algunas causas frecuentes de las náuseas y los vómitos agudos?  •La intoxicación alimentaria      •Grandes cantidades de alcohol      •Ciertos medicamentos, demasiada cantidad de cualquier medicamento o la interrupción repentina de un medicamento regular      •[place] tempranas del embarazo      •Infección en el estómago, los intestinos u otros órganos      •Traumatismo en la cinthia      •Ansiedad o estrés      •Gastroparesia (marlena condición de andrés que impide que el estómago se vacíe adecuadamente)      •Trastornos metabólicos, rosemary uremia o insuficiencia suprarrenal      ¿Cuáles son las causas de las náuseas y vómitos agudos con dolor de estómago?  •Inflamación del apéndice, la vesícula biliar, el estómago, el páncreas, los riñones y otros órganos      •Cálculos biliares      •Marlena bacteria o parásito en el aparato digestivo      •[place] cardíaco      •Úlceras estomacales u obstrucción o contorsión intestinal      ¿Cuáles son las causas de las náuseas y vómitos agudos con otros signos y síntomas?Usted podría sudar y tener la piel pálida, tener problemas digestivos y salivar más que de costumbre. Es posible que estos signos y síntomas joana el resultado de lo siguiente:  •Problemas con sahu pulso, flujo hacia el músculo del corazón, presión arterial, la elmira o líquido del estómago      •Aumento de la presión o hemorragia en el cerebro      •Inflamación del tejido que recubre el cerebro      •Migrañas o convulsiones      •Trastornos del oído interno que causan problemas de equilibrio      ¿Cómo se diagnostica la causa de las náuseas y los vómitos agudos?Sahu médico lo examinará y le preguntará sobre sahu historial médico. Informe a sahu médico acerca de cualquier otro signo y síntoma que tenga. Informe a sahu médico cuándo tuvo náuseas y vómitos por última vez y cuánto duraron. Indique si sucedió antes, amanda o después de comer, y cuánto comió. Sahu médico necesitará saber cuánto salió cuando vomitó, y si fue rápido y contundente. Dígale a sahu médico si sahu vómito olía a evacuación intestinal o contenía elmira o alimentos. Dígale a sahu médico si el vómito era de color amarillo brillante. Es posible que usted necesite alguno de los siguientes:   •Los análisis de sangrese usan para detectar marlena infección o inflamación.      •Imágenes por radiografía, por resonancia magnética o por tomografía computarizadase pueden usar para detectar marlena lesión o un bloqueo.      ¿Cómo se tratan las náuseas y vómitos agudos?El primer objetivo del tratamiento para las náuseas y los vómitos es prevenir o tratar la deshidratación. El tratamiento también dependerá de la causa de las náuseas y vómitos. También se tratará cualquier condición médica que esté causando las náuseas y los vómitos. La meta del tratamiento también es detener o prevenir ela signos y síntomas. Usted podría necesitar agustin o más de los siguientes:  •Los medicamentosse puede administrar para calmarle el estómago y detener ela vómitos. Usted también puede necesitar medicamentos para ayudarlo a sentirse más relajado o para detener las náuseas y los vómitos causados por el mareo por movimiento. Pueden usarse estimulantes gastrointestinales para ayudar a vaciar sahu estómago y los intestinos. Guinda puede ayudar a disminuir las náuseas y los vómitos.      •Líquidos por vía intravenosa (IV)podrían administrarse para reemplazar los electrolitos y fluidos perdidos. Guinda puede ser necesario si no puede beber líquidos.      •Sonda nasogástrica (NG):Se coloca marlena sonda NG dentro de sahu nariz, y pasa por sahu garganta hasta que llega al estómago. Se puede administrar medicamento o alimento a través de la sonda nasogástrica, si usted no puede soila nada por la boca. Si en vez de esto, el médico necesita mantener sahu estómago vacío, podría conectar el tubo a marlena máquina de succión.      ¿Qué puedo hacer para prevenir o tratar las náuseas y los vómitos agudos?  •No consuma alcohol.El alcohol podría causarle malestar o irritación estomacal. Marlena cantidad elevada de alcohol también puede causar náuseas y vómitos agudos.      •Controle el estrés.Los kae de cinthia que son el resultado de tensión nerviosa pueden causar náuseas y vómitos. Busque la manera de relajarse y controlar el estrés. Descanse y duerma más.      •[place] más líquidos rosemary se le indique.Los vómitos pueden llevar a la deshidratación. Es importante beber más líquidos para ayudar a reemplazar los fluidos corporales perdidos. Pregunte a sahu médico sobre la cantidad de líquido que necesita soila todos los joceline y cuáles le recomienda. Sahu médico podría recomendarle que tome marlena solución de rehidratación oral (SRO). Las soluciones de rehidratación oral contienen la cantidad adecuada de agua, sales y azúcar que necesita para restituir los líquidos que perdió sahu organismo. Pregunte qué tipo de solución de rehidratación oral debe usar, qué cantidad debe soila y dónde puede obtenerla.      •Ingiera comidas más pequeñas, más a menudo.Coma pequeñas cantidades de comida cada 2 o 3 horas, incluso si no tiene hambre. Ela náuseas podrían disminuir si tiene comida en el estómago.      •Hable con sahu médico antes de soila medicamentos de venta marie.Estos medicamentos pueden causar problemas serios si los usa junto con ciertos medicamentos o si tiene determinadas condiciones médicas. Podrían tener problemas si usa marlena dosis demasiado yosef o los usa amanda más tiempo de lo indicado. Siga las indicaciones de la etiqueta al pie de la letra.      ¿Cuándo jaya buscar atención inmediata?  •Usted nota elmira en sahu vómito o en ela evacuaciones.      •Usted siente un dolor súbito e intenso en el pecho y la parte superior de shau abdomen después de tener vómitos francia o tratar de vomitar.      •Usted tiene el kika y el pecho inflamados.      •Usted está mareado, tiene frío, sed y sequedad en los ojos y la boca.      •Usted está orinando muy poco o nada en absoluto.      •Usted tiene debilidad muscular, calambres en las piernas y dificultad para respirar.      •Sahu corazón late más rápido que de costumbre.      •Usted continúa vomitando por más de 48 horas.      ¿Cuándo jaya comunicarme con mi médico?  •Usted tiene arcadas secas (vómitos sin que salga nada) frecuentes.      •Ela náusea y vómitos no mejoran ni desaparecen después de usar el medicamento.      •Usted tiene preguntas o inquietudes acerca de sahu condición o cuidado.      ACUERDOS SOBRE SAHU CUIDADO:    Usted tiene el derecho de ayudar a planear sahu cuidado. Aprenda todo lo que pueda sobre sahu condición y rosemary darle tratamiento. Discuta ela opciones de tratamiento con ela médicos para decidir el cuidado que usted desea recibir. Usted siempre tiene el derecho de rechazar el tratamiento.

## 2025-06-09 NOTE — ED PROVIDER NOTE - CLINICAL SUMMARY MEDICAL DECISION MAKING FREE TEXT BOX
48 yo female PMHx abdominoplasty (2023) presents to ED c/o vomiting x10 hours s/p injecting highest dose of unprescribed Wegovy. 48 yo female PMHx abdominoplasty (2023) presents to ED c/o vomiting x10 hours s/p injecting highest dose of unprescribed Wegovy. CT negative. Patient educated on not taking Wegovy unless under supervision of doctor. Medically stable for discharge.

## 2025-06-09 NOTE — ED PROVIDER NOTE - PROGRESS NOTE DETAILS
RUBY Rios: All results reviewed. Feeling better. Abdomen benign. Drinking water. Wants to wait for CT. RUBY Rios: Results reviewed with patient.  : Manish RUBY Rios: Feels better. Agrees with discharge.

## 2025-06-10 VITALS
HEART RATE: 85 BPM | TEMPERATURE: 98 F | SYSTOLIC BLOOD PRESSURE: 135 MMHG | RESPIRATION RATE: 18 BRPM | OXYGEN SATURATION: 99 % | DIASTOLIC BLOOD PRESSURE: 84 MMHG

## 2025-06-10 PROCEDURE — 85025 COMPLETE CBC W/AUTO DIFF WBC: CPT

## 2025-06-10 PROCEDURE — 36415 COLL VENOUS BLD VENIPUNCTURE: CPT

## 2025-06-10 PROCEDURE — 96374 THER/PROPH/DIAG INJ IV PUSH: CPT | Mod: XU

## 2025-06-10 PROCEDURE — 74177 CT ABD & PELVIS W/CONTRAST: CPT

## 2025-06-10 PROCEDURE — 80053 COMPREHEN METABOLIC PANEL: CPT

## 2025-06-10 PROCEDURE — 96375 TX/PRO/DX INJ NEW DRUG ADDON: CPT

## 2025-06-10 PROCEDURE — T1013: CPT

## 2025-06-10 PROCEDURE — 96376 TX/PRO/DX INJ SAME DRUG ADON: CPT

## 2025-06-10 PROCEDURE — 84702 CHORIONIC GONADOTROPIN TEST: CPT

## 2025-06-10 PROCEDURE — 83690 ASSAY OF LIPASE: CPT

## 2025-06-10 PROCEDURE — 74177 CT ABD & PELVIS W/CONTRAST: CPT | Mod: 26

## 2025-06-10 PROCEDURE — 99284 EMERGENCY DEPT VISIT MOD MDM: CPT | Mod: 25

## 2025-06-10 RX ORDER — ONDANSETRON HCL/PF 4 MG/2 ML
1 VIAL (ML) INJECTION
Qty: 9 | Refills: 0
Start: 2025-06-10 | End: 2025-06-12

## 2025-06-10 RX ORDER — ONDANSETRON HCL/PF 4 MG/2 ML
4 VIAL (ML) INJECTION ONCE
Refills: 0 | Status: COMPLETED | OUTPATIENT
Start: 2025-06-10 | End: 2025-06-10

## 2025-06-10 RX ADMIN — Medication 4 MILLIGRAM(S): at 04:31

## 2025-06-10 NOTE — ED ADULT NURSE NOTE - OBJECTIVE STATEMENT
pt c/o nausea and vomiting x1 day. pt states took Wegovy medication from friend and has been having N/V with abd pain since. denies any chest pain, fevers, chills

## 2025-06-14 DIAGNOSIS — R10.84 GENERALIZED ABDOMINAL PAIN: ICD-10-CM

## 2025-06-14 DIAGNOSIS — R11.10 VOMITING, UNSPECIFIED: ICD-10-CM

## (undated) DEVICE — STERIS DEFENDO 3-PIECE KIT (AIR/WATER, SUCTION & BIOPSY VALVES)